# Patient Record
Sex: FEMALE | Race: WHITE | NOT HISPANIC OR LATINO | Employment: OTHER | URBAN - METROPOLITAN AREA
[De-identification: names, ages, dates, MRNs, and addresses within clinical notes are randomized per-mention and may not be internally consistent; named-entity substitution may affect disease eponyms.]

---

## 2019-12-09 ENCOUNTER — HISTORICAL (OUTPATIENT)
Dept: WOUND CARE | Facility: HOSPITAL | Age: 64
End: 2019-12-09

## 2020-02-05 LAB — GRAM STN SPEC: NORMAL

## 2020-02-08 LAB
FINAL CULTURE: NORMAL
FINAL CULTURE: NORMAL

## 2020-02-12 LAB — GRAM STN SPEC: NORMAL

## 2020-02-15 LAB
FINAL CULTURE: NORMAL
FINAL CULTURE: NORMAL

## 2020-08-31 ENCOUNTER — HISTORICAL (OUTPATIENT)
Dept: RADIOLOGY | Facility: HOSPITAL | Age: 65
End: 2020-08-31

## 2021-05-25 PROBLEM — S42.201D CLOSED FRACTURE OF PROXIMAL END OF RIGHT HUMERUS WITH ROUTINE HEALING: Status: ACTIVE | Noted: 2021-05-25

## 2021-09-22 ENCOUNTER — HISTORICAL (OUTPATIENT)
Dept: RADIOLOGY | Facility: HOSPITAL | Age: 66
End: 2021-09-22

## 2021-09-22 ENCOUNTER — HISTORICAL (OUTPATIENT)
Dept: WOUND CARE | Facility: HOSPITAL | Age: 66
End: 2021-09-22

## 2024-01-23 ENCOUNTER — HOSPITAL ENCOUNTER (INPATIENT)
Facility: HOSPITAL | Age: 69
LOS: 3 days | Discharge: HOME | DRG: 280 | End: 2024-01-26
Attending: FAMILY MEDICINE | Admitting: HOSPITALIST
Payer: MEDICARE

## 2024-01-23 DIAGNOSIS — K92.2 GASTROINTESTINAL HEMORRHAGE, UNSPECIFIED GASTROINTESTINAL HEMORRHAGE TYPE: ICD-10-CM

## 2024-01-23 DIAGNOSIS — N18.6 ESRD (END STAGE RENAL DISEASE) ON DIALYSIS: ICD-10-CM

## 2024-01-23 DIAGNOSIS — E87.6 HYPOKALEMIA: ICD-10-CM

## 2024-01-23 DIAGNOSIS — R00.0 TACHYCARDIA: ICD-10-CM

## 2024-01-23 DIAGNOSIS — I21.4 NSTEMI (NON-ST ELEVATED MYOCARDIAL INFARCTION): ICD-10-CM

## 2024-01-23 DIAGNOSIS — R00.1 BRADYCARDIA: ICD-10-CM

## 2024-01-23 DIAGNOSIS — Z99.2 ESRD (END STAGE RENAL DISEASE) ON DIALYSIS: ICD-10-CM

## 2024-01-23 DIAGNOSIS — I48.91 ATRIAL FIBRILLATION WITH RVR: Primary | ICD-10-CM

## 2024-01-23 DIAGNOSIS — R53.1 WEAK: ICD-10-CM

## 2024-01-23 DIAGNOSIS — E83.42 HYPOMAGNESEMIA: ICD-10-CM

## 2024-01-23 LAB
ABO + RH BLD: NORMAL
ABO + RH BLD: NORMAL
ABO AND RH: NORMAL
ABORH RETYPE: NORMAL
ALBUMIN SERPL-MCNC: 2.8 G/DL (ref 3.4–5)
ALBUMIN/GLOB SERPL: 0.9 RATIO
ALP SERPL-CCNC: 136 UNIT/L (ref 50–144)
ALT SERPL-CCNC: 40 UNIT/L (ref 1–45)
ANION GAP SERPL CALC-SCNC: 11 MEQ/L (ref 2–13)
ANTIBODY SCREEN: NORMAL
APTT PPP: 36.2 SECONDS (ref 23–29.4)
AST SERPL-CCNC: 48 UNIT/L (ref 14–36)
BASOPHILS # BLD AUTO: 0.03 X10(3)/MCL (ref 0.01–0.08)
BASOPHILS NFR BLD AUTO: 0.2 % (ref 0.1–1.2)
BILIRUB SERPL-MCNC: 0.4 MG/DL (ref 0–1)
BLD PROD TYP BPU: NORMAL
BLD PROD TYP BPU: NORMAL
BLOOD UNIT EXPIRATION DATE: NORMAL
BLOOD UNIT EXPIRATION DATE: NORMAL
BLOOD UNIT TYPE CODE: 600
BLOOD UNIT TYPE CODE: 600
BUN SERPL-MCNC: 28 MG/DL (ref 7–20)
CALCIUM SERPL-MCNC: 7.4 MG/DL (ref 8.4–10.2)
CHLORIDE SERPL-SCNC: 99 MMOL/L (ref 98–110)
CK MB SERPL-MCNC: 2.02 NG/ML (ref 0–3.38)
CK SERPL-CCNC: 25 U/L (ref 30–135)
CO2 SERPL-SCNC: 24 MMOL/L (ref 21–32)
CREAT SERPL-MCNC: 2.05 MG/DL (ref 0.66–1.25)
CREAT/UREA NIT SERPL: 14 (ref 12–20)
CROSSMATCH INTERPRETATION: NORMAL
CROSSMATCH INTERPRETATION: NORMAL
DISPENSE STATUS: NORMAL
DISPENSE STATUS: NORMAL
EOSINOPHIL # BLD AUTO: 0.13 X10(3)/MCL (ref 0.04–0.36)
EOSINOPHIL NFR BLD AUTO: 0.9 % (ref 0.7–7)
ERYTHROCYTE [DISTWIDTH] IN BLOOD BY AUTOMATED COUNT: 16.6 % (ref 11–14.5)
GFR SERPLBLD CREATININE-BSD FMLA CKD-EPI: 26 MLS/MIN/1.73/M2
GLOBULIN SER-MCNC: 3.1 GM/DL (ref 2–3.9)
GLUCOSE SERPL-MCNC: 94 MG/DL (ref 70–115)
HCT VFR BLD AUTO: 19.5 % (ref 36–48)
HCT VFR BLD AUTO: 26 % (ref 36–48)
HGB BLD-MCNC: 6.6 G/DL (ref 11.8–16)
HGB BLD-MCNC: 8.9 G/DL (ref 11.8–16)
IMM GRANULOCYTES # BLD AUTO: 0.09 X10(3)/MCL (ref 0–0.03)
IMM GRANULOCYTES NFR BLD AUTO: 0.6 % (ref 0–0.5)
INR PPP: 1.4
LYMPHOCYTES # BLD AUTO: 0.89 X10(3)/MCL (ref 1.16–3.74)
LYMPHOCYTES NFR BLD AUTO: 6.2 % (ref 20–55)
MAGNESIUM SERPL-MCNC: 1.4 MG/DL (ref 1.8–2.4)
MCH RBC QN AUTO: 31.4 PG (ref 27–34)
MCHC RBC AUTO-ENTMCNC: 33.8 G/DL (ref 31–37)
MCV RBC AUTO: 92.9 FL (ref 79–99)
MONOCYTES # BLD AUTO: 0.69 X10(3)/MCL (ref 0.24–0.36)
MONOCYTES NFR BLD AUTO: 4.8 % (ref 4.7–12.5)
NEUTROPHILS # BLD AUTO: 12.45 X10(3)/MCL (ref 1.56–6.13)
NEUTROPHILS NFR BLD AUTO: 87.3 % (ref 37–73)
NRBC BLD AUTO-RTO: 0 %
PLATELET # BLD AUTO: 177 X10(3)/MCL (ref 140–371)
PMV BLD AUTO: 8.5 FL (ref 9.4–12.4)
POCT GLUCOSE: 219 MG/DL (ref 70–110)
POTASSIUM SERPL-SCNC: 3.1 MMOL/L (ref 3.5–5.1)
PROT SERPL-MCNC: 5.9 GM/DL (ref 6.3–8.2)
PROTHROMBIN TIME: 14 SECONDS (ref 9.3–11.9)
RBC # BLD AUTO: 2.1 X10(6)/MCL (ref 4–5.1)
SODIUM SERPL-SCNC: 134 MMOL/L (ref 135–145)
SPECIMEN OUTDATE: NORMAL
TROPONIN I SERPL-MCNC: <0.012 NG/ML (ref 0–0.03)
UNIT NUMBER: NORMAL
UNIT NUMBER: NORMAL
WBC # SPEC AUTO: 14.28 X10(3)/MCL (ref 4–11.5)

## 2024-01-23 PROCEDURE — C9113 INJ PANTOPRAZOLE SODIUM, VIA: HCPCS | Performed by: FAMILY MEDICINE

## 2024-01-23 PROCEDURE — 86850 RBC ANTIBODY SCREEN: CPT | Performed by: FAMILY MEDICINE

## 2024-01-23 PROCEDURE — 93005 ELECTROCARDIOGRAM TRACING: CPT

## 2024-01-23 PROCEDURE — 82550 ASSAY OF CK (CPK): CPT | Performed by: FAMILY MEDICINE

## 2024-01-23 PROCEDURE — 99291 CRITICAL CARE FIRST HOUR: CPT

## 2024-01-23 PROCEDURE — 25000003 PHARM REV CODE 250: Performed by: FAMILY MEDICINE

## 2024-01-23 PROCEDURE — 83540 ASSAY OF IRON: CPT | Performed by: FAMILY MEDICINE

## 2024-01-23 PROCEDURE — 93010 ELECTROCARDIOGRAM REPORT: CPT | Mod: ,,, | Performed by: INTERNAL MEDICINE

## 2024-01-23 PROCEDURE — 96361 HYDRATE IV INFUSION ADD-ON: CPT

## 2024-01-23 PROCEDURE — 83735 ASSAY OF MAGNESIUM: CPT | Performed by: FAMILY MEDICINE

## 2024-01-23 PROCEDURE — 82553 CREATINE MB FRACTION: CPT | Performed by: FAMILY MEDICINE

## 2024-01-23 PROCEDURE — 85025 COMPLETE CBC W/AUTO DIFF WBC: CPT | Performed by: FAMILY MEDICINE

## 2024-01-23 PROCEDURE — 36415 COLL VENOUS BLD VENIPUNCTURE: CPT | Performed by: FAMILY MEDICINE

## 2024-01-23 PROCEDURE — 36430 TRANSFUSION BLD/BLD COMPNT: CPT

## 2024-01-23 PROCEDURE — 85610 PROTHROMBIN TIME: CPT | Performed by: FAMILY MEDICINE

## 2024-01-23 PROCEDURE — 86923 COMPATIBILITY TEST ELECTRIC: CPT | Mod: 91 | Performed by: FAMILY MEDICINE

## 2024-01-23 PROCEDURE — 96368 THER/DIAG CONCURRENT INF: CPT

## 2024-01-23 PROCEDURE — 20000000 HC ICU ROOM

## 2024-01-23 PROCEDURE — 30233N1 TRANSFUSION OF NONAUTOLOGOUS RED BLOOD CELLS INTO PERIPHERAL VEIN, PERCUTANEOUS APPROACH: ICD-10-PCS | Performed by: HOSPITALIST

## 2024-01-23 PROCEDURE — 96375 TX/PRO/DX INJ NEW DRUG ADDON: CPT

## 2024-01-23 PROCEDURE — 80053 COMPREHEN METABOLIC PANEL: CPT | Performed by: FAMILY MEDICINE

## 2024-01-23 PROCEDURE — 85018 HEMOGLOBIN: CPT | Performed by: FAMILY MEDICINE

## 2024-01-23 PROCEDURE — P9016 RBC LEUKOCYTES REDUCED: HCPCS | Performed by: FAMILY MEDICINE

## 2024-01-23 PROCEDURE — 84484 ASSAY OF TROPONIN QUANT: CPT | Performed by: FAMILY MEDICINE

## 2024-01-23 PROCEDURE — 85730 THROMBOPLASTIN TIME PARTIAL: CPT | Performed by: FAMILY MEDICINE

## 2024-01-23 PROCEDURE — 96365 THER/PROPH/DIAG IV INF INIT: CPT | Mod: 59

## 2024-01-23 PROCEDURE — 63600175 PHARM REV CODE 636 W HCPCS: Performed by: FAMILY MEDICINE

## 2024-01-23 PROCEDURE — 25000003 PHARM REV CODE 250: Performed by: HOSPITALIST

## 2024-01-23 RX ORDER — PHENAZOPYRIDINE HYDROCHLORIDE 200 MG/1
200 TABLET, FILM COATED ORAL
COMMUNITY

## 2024-01-23 RX ORDER — PROCHLORPERAZINE EDISYLATE 5 MG/ML
10 INJECTION INTRAMUSCULAR; INTRAVENOUS
Status: COMPLETED | OUTPATIENT
Start: 2024-01-23 | End: 2024-01-23

## 2024-01-23 RX ORDER — METOPROLOL TARTRATE 1 MG/ML
5 INJECTION, SOLUTION INTRAVENOUS
Status: DISCONTINUED | OUTPATIENT
Start: 2024-01-23 | End: 2024-01-23

## 2024-01-23 RX ORDER — FAMOTIDINE 10 MG/ML
20 INJECTION INTRAVENOUS DAILY
Status: DISCONTINUED | OUTPATIENT
Start: 2024-01-23 | End: 2024-01-26 | Stop reason: HOSPADM

## 2024-01-23 RX ORDER — ONDANSETRON HYDROCHLORIDE 2 MG/ML
4 INJECTION, SOLUTION INTRAVENOUS EVERY 8 HOURS PRN
Status: DISCONTINUED | OUTPATIENT
Start: 2024-01-23 | End: 2024-01-26 | Stop reason: HOSPADM

## 2024-01-23 RX ORDER — SODIUM CHLORIDE 0.9 % (FLUSH) 0.9 %
10 SYRINGE (ML) INJECTION
Status: DISCONTINUED | OUTPATIENT
Start: 2024-01-23 | End: 2024-01-26 | Stop reason: HOSPADM

## 2024-01-23 RX ORDER — DILTIAZEM HYDROCHLORIDE 30 MG/1
120 TABLET, FILM COATED ORAL DAILY
Status: DISCONTINUED | OUTPATIENT
Start: 2024-01-24 | End: 2024-01-25

## 2024-01-23 RX ORDER — DILTIAZEM HYDROCHLORIDE 5 MG/ML
15 INJECTION INTRAVENOUS
Status: DISCONTINUED | OUTPATIENT
Start: 2024-01-23 | End: 2024-01-23

## 2024-01-23 RX ORDER — IBUPROFEN 200 MG
24 TABLET ORAL
Status: DISCONTINUED | OUTPATIENT
Start: 2024-01-23 | End: 2024-01-26 | Stop reason: HOSPADM

## 2024-01-23 RX ORDER — PROCHLORPERAZINE EDISYLATE 5 MG/ML
5 INJECTION INTRAMUSCULAR; INTRAVENOUS EVERY 6 HOURS PRN
Status: DISCONTINUED | OUTPATIENT
Start: 2024-01-23 | End: 2024-01-26 | Stop reason: HOSPADM

## 2024-01-23 RX ORDER — SODIUM,POTASSIUM PHOSPHATES 280-250MG
2 POWDER IN PACKET (EA) ORAL
Status: DISCONTINUED | OUTPATIENT
Start: 2024-01-23 | End: 2024-01-26 | Stop reason: HOSPADM

## 2024-01-23 RX ORDER — LANOLIN ALCOHOL/MO/W.PET/CERES
800 CREAM (GRAM) TOPICAL
Status: DISCONTINUED | OUTPATIENT
Start: 2024-01-23 | End: 2024-01-26 | Stop reason: HOSPADM

## 2024-01-23 RX ORDER — FAMOTIDINE 20 MG/1
20 TABLET, FILM COATED ORAL 2 TIMES DAILY
COMMUNITY

## 2024-01-23 RX ORDER — FERROUS SULFATE 325(65) MG
325 TABLET, DELAYED RELEASE (ENTERIC COATED) ORAL 2 TIMES DAILY
COMMUNITY

## 2024-01-23 RX ORDER — HYDROCODONE BITARTRATE AND ACETAMINOPHEN 500; 5 MG/1; MG/1
TABLET ORAL
Status: DISCONTINUED | OUTPATIENT
Start: 2024-01-23 | End: 2024-01-26 | Stop reason: HOSPADM

## 2024-01-23 RX ORDER — METOPROLOL TARTRATE 1 MG/ML
5 INJECTION, SOLUTION INTRAVENOUS
Status: COMPLETED | OUTPATIENT
Start: 2024-01-23 | End: 2024-01-23

## 2024-01-23 RX ORDER — POTASSIUM CHLORIDE 7.45 MG/ML
10 INJECTION INTRAVENOUS
Status: COMPLETED | OUTPATIENT
Start: 2024-01-23 | End: 2024-01-23

## 2024-01-23 RX ORDER — MAGNESIUM SULFATE HEPTAHYDRATE 40 MG/ML
2 INJECTION, SOLUTION INTRAVENOUS
Status: COMPLETED | OUTPATIENT
Start: 2024-01-23 | End: 2024-01-23

## 2024-01-23 RX ORDER — HYDROXYZINE HYDROCHLORIDE 25 MG/1
25 TABLET, FILM COATED ORAL 4 TIMES DAILY
COMMUNITY

## 2024-01-23 RX ORDER — IBUPROFEN 200 MG
16 TABLET ORAL
Status: DISCONTINUED | OUTPATIENT
Start: 2024-01-23 | End: 2024-01-26 | Stop reason: HOSPADM

## 2024-01-23 RX ORDER — FLUOXETINE 10 MG/1
10 TABLET ORAL DAILY
COMMUNITY

## 2024-01-23 RX ORDER — DILTIAZEM HYDROCHLORIDE 120 MG/1
120 TABLET, FILM COATED ORAL DAILY
COMMUNITY

## 2024-01-23 RX ORDER — METOPROLOL TARTRATE 50 MG/1
50 TABLET ORAL 2 TIMES DAILY
COMMUNITY

## 2024-01-23 RX ORDER — PANTOPRAZOLE SODIUM 40 MG/10ML
40 INJECTION, POWDER, LYOPHILIZED, FOR SOLUTION INTRAVENOUS
Status: COMPLETED | OUTPATIENT
Start: 2024-01-23 | End: 2024-01-23

## 2024-01-23 RX ORDER — ACETAMINOPHEN 325 MG/1
650 TABLET ORAL EVERY 4 HOURS PRN
Status: DISCONTINUED | OUTPATIENT
Start: 2024-01-23 | End: 2024-01-26 | Stop reason: HOSPADM

## 2024-01-23 RX ORDER — HYDRALAZINE HYDROCHLORIDE 25 MG/1
25 TABLET, FILM COATED ORAL 3 TIMES DAILY
COMMUNITY

## 2024-01-23 RX ORDER — ASCORBIC ACID 500 MG
500 TABLET ORAL 2 TIMES DAILY
COMMUNITY

## 2024-01-23 RX ORDER — POLYETHYLENE GLYCOL 3350 17 G/17G
17 POWDER, FOR SOLUTION ORAL DAILY
COMMUNITY

## 2024-01-23 RX ORDER — DILTIAZEM HYDROCHLORIDE 5 MG/ML
10 INJECTION INTRAVENOUS
Status: COMPLETED | OUTPATIENT
Start: 2024-01-23 | End: 2024-01-23

## 2024-01-23 RX ORDER — FLUOXETINE 10 MG/1
10 CAPSULE ORAL DAILY
Status: DISCONTINUED | OUTPATIENT
Start: 2024-01-24 | End: 2024-01-26 | Stop reason: HOSPADM

## 2024-01-23 RX ORDER — TRAZODONE HYDROCHLORIDE 50 MG/1
50 TABLET ORAL NIGHTLY
Status: DISCONTINUED | OUTPATIENT
Start: 2024-01-23 | End: 2024-01-26 | Stop reason: HOSPADM

## 2024-01-23 RX ORDER — GLUCAGON 1 MG
1 KIT INJECTION
Status: DISCONTINUED | OUTPATIENT
Start: 2024-01-23 | End: 2024-01-26 | Stop reason: HOSPADM

## 2024-01-23 RX ADMIN — TRAZODONE HYDROCHLORIDE 50 MG: 50 TABLET ORAL at 10:01

## 2024-01-23 RX ADMIN — SODIUM CHLORIDE 1000 ML: 9 INJECTION, SOLUTION INTRAVENOUS at 02:01

## 2024-01-23 RX ADMIN — MAGNESIUM SULFATE HEPTAHYDRATE 2 G: 40 INJECTION, SOLUTION INTRAVENOUS at 04:01

## 2024-01-23 RX ADMIN — FAMOTIDINE 20 MG: 10 INJECTION, SOLUTION INTRAVENOUS at 10:01

## 2024-01-23 RX ADMIN — PROCHLORPERAZINE EDISYLATE 10 MG: 5 INJECTION INTRAMUSCULAR; INTRAVENOUS at 03:01

## 2024-01-23 RX ADMIN — INSULIN DETEMIR 20 UNITS: 100 INJECTION, SOLUTION SUBCUTANEOUS at 10:01

## 2024-01-23 RX ADMIN — DEXTROSE MONOHYDRATE 5 MG/HR: 50 INJECTION, SOLUTION INTRAVENOUS at 04:01

## 2024-01-23 RX ADMIN — PANTOPRAZOLE SODIUM 40 MG: 40 INJECTION, POWDER, LYOPHILIZED, FOR SOLUTION INTRAVENOUS at 03:01

## 2024-01-23 RX ADMIN — DILTIAZEM HYDROCHLORIDE 10 MG: 5 INJECTION INTRAVENOUS at 03:01

## 2024-01-23 RX ADMIN — POTASSIUM CHLORIDE 10 MEQ: 7.46 INJECTION, SOLUTION INTRAVENOUS at 03:01

## 2024-01-23 RX ADMIN — METOPROLOL TARTRATE 5 MG: 1 INJECTION, SOLUTION INTRAVENOUS at 04:01

## 2024-01-23 NOTE — ED PROVIDER NOTES
"Encounter Date: 1/23/2024       History     Chief Complaint   Patient presents with    Hypotension    Tachycardia     Pt reports "I was at dialysis this morning and they were giving me fluids after." EMS reports "they realized she was hypotensive. She vomited a lot on the way here too." Pt c/o left sided chest pain described as "burning pressure" starting during dialysis.     Nausea    Vomiting    Chest Pain     Patient presented to the emergency room from dialysis.  After dialysis, it was noted that the patient's blood pressure was very low in the 50s, and she was feeling weak and nauseated.  They started IV fluids and called for EMS transport.  Patient notes that she has been nauseated and throwing up for the past day or so.  She does take Eliquis for known AFib.    The history is provided by the patient and the EMS personnel.     Review of patient's allergies indicates:  No Known Allergies  No past medical history on file.  No past surgical history on file.  No family history on file.  Social History     Tobacco Use    Smoking status: Former    Smokeless tobacco: Never   Substance Use Topics    Alcohol use: Never    Drug use: Never     Review of Systems   Constitutional:  Positive for fatigue. Negative for fever.   HENT:  Negative for sore throat.    Respiratory:  Negative for shortness of breath.    Cardiovascular:  Negative for chest pain.   Gastrointestinal:  Positive for nausea and vomiting.   Genitourinary:  Negative for dysuria.   Musculoskeletal:  Negative for back pain.   Skin:  Negative for rash.   Neurological:  Negative for weakness.   Hematological:  Does not bruise/bleed easily.   All other systems reviewed and are negative.      Physical Exam     Initial Vitals   BP Pulse Resp Temp SpO2   01/23/24 1447 01/23/24 1443 01/23/24 1443 01/23/24 1443 01/23/24 1443   (!) 59/38 (!) 142 20 98.2 °F (36.8 °C) 98 %      MAP       --                Physical Exam    Nursing note and vitals " reviewed.  Constitutional:   Elderly patient looks uncomfortable but is in no distress.   HENT:   Head: Normocephalic and atraumatic.   Eyes: EOM are normal. Pupils are equal, round, and reactive to light.   Pale conjunctiva   Neck: Neck supple.   Normal range of motion.  Cardiovascular:  Normal rate, regular rhythm and normal heart sounds.           Pulmonary/Chest: Breath sounds normal.   Abdominal: Abdomen is soft. Bowel sounds are normal. There is no abdominal tenderness.   Musculoskeletal:         General: No edema. Normal range of motion.      Cervical back: Normal range of motion and neck supple.     Neurological: She is alert and oriented to person, place, and time.   Skin: Skin is warm and dry. Capillary refill takes less than 2 seconds.   Psychiatric: She has a normal mood and affect.         ED Course   Critical Care    Date/Time: 1/23/2024 2:35 PM    Performed by: Tony Larose MD  Authorized by: Tony Larose MD  Direct patient critical care time: 15 minutes  Ordering / reviewing critical care time: 15 minutes  Documentation critical care time: 10 minutes  Total critical care time (exclusive of procedural time) : 40 minutes  Critical care was necessary to treat or prevent imminent or life-threatening deterioration of the following conditions: cardiac failure and metabolic crisis.  Critical care was time spent personally by me on the following activities: interpretation of cardiac output measurements, evaluation of patient's response to treatment, examination of patient, obtaining history from patient or surrogate, ordering and performing treatments and interventions, ordering and review of laboratory studies, ordering and review of radiographic studies, pulse oximetry and re-evaluation of patient's condition.        Labs Reviewed   COMPREHENSIVE METABOLIC PANEL - Abnormal; Notable for the following components:       Result Value    Sodium Level 134 (*)     Potassium Level 3.1 (*)     Blood Urea  Nitrogen 28.0 (*)     Creatinine 2.05 (*)     Calcium Level Total 7.4 (*)     Protein Total 5.9 (*)     Albumin Level 2.8 (*)     Aspartate Aminotransferase 48 (*)     All other components within normal limits   CK - Abnormal; Notable for the following components:    Creatine Kinase 25 (*)     All other components within normal limits   MAGNESIUM - Abnormal; Notable for the following components:    Magnesium Level 1.40 (*)     All other components within normal limits   CBC WITH DIFFERENTIAL - Abnormal; Notable for the following components:    WBC 14.28 (*)     RBC 2.10 (*)     Hgb 6.6 (*)     Hct 19.5 (*)     RDW 16.6 (*)     MPV 8.5 (*)     Neut % 87.3 (*)     Lymph % 6.2 (*)     Lymph # 0.89 (*)     Neut # 12.45 (*)     Mono # 0.69 (*)     IG# 0.09 (*)     IG% 0.6 (*)     All other components within normal limits   TROPONIN I - Normal   CK-MB - Normal   CBC W/ AUTO DIFFERENTIAL    Narrative:     The following orders were created for panel order CBC Auto Differential.  Procedure                               Abnormality         Status                     ---------                               -----------         ------                     CBC with Differential[[id number]]       Abnormal            Final result                 Please view results for these tests on the individual orders.   PROTIME-INR   APTT   IRON AND TIBC   TYPE & SCREEN   ABORH RETYPE   PREPARE RBC SOFT     EKG Readings: (Independently Interpreted)   EKG with afib with RvR (140s), diffuse ST depression, nl T (known afib).         Imaging Results    None          Medications   0.9%  NaCl infusion (for blood administration) (has no administration in time range)   magnesium sulfate 2g in water 50mL IVPB (premix) (2 g Intravenous New Bag 1/23/24 1617)   diltiaZEM 100 mg in dextrose 5% 100 mL IVPB (ready to mix) (titrating) (has no administration in time range)   diltiaZEM injection 10 mg (10 mg Intravenous Given 1/23/24 1513)   pantoprazole  injection 40 mg (40 mg Intravenous Given 1/23/24 1517)   sodium chloride 0.9% bolus 1,000 mL 1,000 mL (0 mLs Intravenous Stopped 1/23/24 1545)   potassium chloride 10 mEq in 100 mL IVPB (10 mEq Intravenous New Bag 1/23/24 1553)   prochlorperazine injection Soln 10 mg (10 mg Intravenous Given 1/23/24 1551)   metoprolol injection 5 mg (5 mg Intravenous Given 1/23/24 1613)     Medical Decision Making  BP 50s systolic.  Pulse 140s    After bolus, BP to 60s.  Given 1 dose of cardizem, pulse still in 120s, BP to 90s.    H/H 6/19.  Suspect GI bleed as source.  PPI, transfusion ordered.  BUN/Cr 28/2.  K 3.1.  Will replace. Mag 1.4.  Will replace.    Minimal HR response to metoprolol.  Will start Cardizem drip.  BP much improved, , also correlating to correction of Mag and K.    Amount and/or Complexity of Data Reviewed  Labs: ordered.    Risk  Prescription drug management.                                      Clinical Impression:  Final diagnoses:  [R53.1] Weak  [K92.2] Gastrointestinal hemorrhage, unspecified gastrointestinal hemorrhage type  [N18.6, Z99.2] ESRD (end stage renal disease) on dialysis  [I48.91] Atrial fibrillation with RVR (Primary)  [E83.42] Hypomagnesemia  [E87.6] Hypokalemia          ED Disposition Condition    Admit Stable                Tony Larose MD  01/23/24 3094

## 2024-01-23 NOTE — H&P
"Tele-Hospitalist History and Physical  Telemedicine Service was provided using HIPPA compliant web platform using SOC for audio/visual equipment.   Patient Location: [place]   Provider Location: [place], [place]  Participants on call: bedside RN, patient  Physician on call: Dr. Beltran  Patient aware of remote access and use of Telemedicine and agrees to continue with care.        History & Physical  Department of Hospital Medicine      SUBJECTIVE:     CC/Reason for Admission to Hospital:   Chief Complaint   Patient presents with    Hypotension    Tachycardia     Pt reports "I was at dialysis this morning and they were giving me fluids after." EMS reports "they realized she was hypotensive. She vomited a lot on the way here too." Pt c/o left sided chest pain described as "burning pressure" starting during dialysis.     Nausea    Vomiting    Chest Pain       History of Present Illness: History obtained from Patient/ER Physician     Pt is a 68 y.o. female with hx of Paroxysmal Afib, ESRD on HD, DMII, Depression sent to ER after HD today with NV and not feeling well during and after HD.  After HD finished she was noted to be Hypotensive and got fluid back.  On arrival to ER was found to be in Afib with RVR and as well as hypotensive and anemic.  No reports of blood loss that she is aware.      Review of patient's allergies indicates:  No Known Allergies     ESRD on HD, HTN, Depression   No past surgical history on file.  No family history on file.  Social History     Tobacco Use    Smoking status: Former    Smokeless tobacco: Never   Substance Use Topics    Alcohol use: Never    Drug use: Never         No current facility-administered medications on file prior to encounter.     Current Outpatient Medications on File Prior to Encounter   Medication Sig Dispense Refill    arginine-vitamin C-vitamin E 4.5 gram-156 mg/9.2 gram PwPk Take 1 packet by mouth 2 (two) times a day.      ascorbic acid, vitamin C, (VITAMIN C) " 500 MG tablet Take 500 mg by mouth 2 (two) times daily.      atorvastatin (LIPITOR) 80 MG tablet Take 80 mg by mouth once daily.      CENTRUM SILVER WOMEN 8 mg iron-400 mcg-300 mcg Tab Take 1 tablet by mouth once daily.      diltiaZEM (CARDIZEM) 120 MG tablet Take 120 mg by mouth Daily.      famotidine (PEPCID) 20 MG tablet Take 20 mg by mouth 2 (two) times daily.      ferrous sulfate 325 (65 FE) MG EC tablet Take 325 mg by mouth 2 (two) times daily.      FLUoxetine 10 MG Tab Take 10 mg by mouth once daily.      hydrALAZINE (APRESOLINE) 25 MG tablet Take 25 mg by mouth 3 (three) times daily.      hydrOXYzine HCL (ATARAX) 25 MG tablet Take 25 mg by mouth 4 (four) times daily.      insulin detemir U-100 (LEVEMIR) 100 unit/mL injection Inject 35 Units into the skin 2 (two) times a day.      insulin regular 100 unit/mL Inj injection Inject into the skin 4 (four) times daily. Per sliding scale      loratadine (CLARITIN) 10 mg tablet Take 10 mg by mouth once daily.      metoprolol tartrate (LOPRESSOR) 50 MG tablet Take 50 mg by mouth 2 (two) times daily.      phenazopyridine (PYRIDIUM) 200 MG tablet Take 200 mg by mouth 3 (three) times daily with meals.      polyethylene glycol (GLYCOLAX) 17 gram PwPk Take 17 g by mouth once daily.      traZODone (DESYREL) 50 MG tablet Take 50 mg by mouth every evening.      [DISCONTINUED] ALPRAZolam (XANAX) 0.25 MG tablet       [DISCONTINUED] aspirin (ECOTRIN) 81 MG EC tablet       [DISCONTINUED] aspirin (ECOTRIN) 81 MG EC tablet Take 81 mg by mouth once daily.      [DISCONTINUED] atorvastatin (LIPITOR) 80 MG tablet       [DISCONTINUED] cyclobenzaprine (FLEXERIL) 10 MG tablet       [DISCONTINUED] gabapentin (NEURONTIN) 300 MG capsule       [DISCONTINUED] HYDROcodone-acetaminophen (NORCO) 5-325 mg per tablet       [DISCONTINUED] metFORMIN (GLUCOPHAGE) 1000 MG tablet metformin 1,000 mg tablet      [DISCONTINUED] nut.tx.gluc.intol,lac-free,soy (GLUCERNA SHAKE) Liqd       [DISCONTINUED]  omeprazole (PRILOSEC) 40 MG capsule       [DISCONTINUED] oxybutynin (DITROPAN) 5 MG Tab       [DISCONTINUED] rivaroxaban (XARELTO) 10 mg Tab       [DISCONTINUED] STOOL SOFTENER-LAXATIVE 8.6-50 mg per tablet Take 1 tablet by mouth 2 (two) times daily.      [DISCONTINUED] TRUE METRIX GLUCOSE TEST STRIP Strp       [DISCONTINUED] TYLENOL 325 mg tablet Take 325 mg by mouth daily as needed.         Review of Systems:  Constitutional: No fever, chills, weight loss  ENT: No nasal congestion or sore throat  Respiratory: No cough or shortness of breath  Cardiovascular: No chest pain or palpitations  Gastrointestinal: No nausea or vomiting, no abdominal pain or change in bowel habits  Genitourinary: No hematuria or dysuria, negative for frequency  Musculoskeletal: No arthralgias or myalgias  Neurological: No seizures or tremors, negative for dizziness and headaches  Psychiatric: No inappropriate thought content. No inappropriate behavior.      OBJECTIVE:     Vital Signs (Most Recent):  Temp: 98.1 °F (36.7 °C) (01/23/24 1716)  Pulse: 100 (01/23/24 1742)  Resp: 18 (01/23/24 1742)  BP: (!) 92/56 (01/23/24 1742)  SpO2: 96 % (01/23/24 1742)       Physical Exam:  General - Resting comfortably in bed. No apparent distress.  HEENT - PERRLA. Extraocular movements intact. No scleral icterus.   Neck -  The JVP is not elevated.   CV - Regular rate and rhythm, No Murmur, rubs, or gallops.  Resp - Good inspiratory effort. The lungs are clear to auscultation no audible wheeze  GI - Soft. Non-tender to palpation.   Extrem -  No cyanosis, clubbing, edema.  Neuro - CN II through XII are grossly intact. Moves all ext well   PSYC - Alert and awake, answers questions. Weak   SKIN - No obvious rash    Data Review:  CBC:   Lab Results   Component Value Date    WBC 14.28 (H) 01/23/2024    RBC 2.10 (L) 01/23/2024    HGB 6.6 (L) 01/23/2024    HCT 19.5 (LL) 01/23/2024     01/23/2024     BMP:   Lab Results   Component Value Date     (L)  01/23/2024    K 3.1 (L) 01/23/2024    CO2 24 01/23/2024    BUN 28.0 (H) 01/23/2024    CREATININE 2.05 (H) 01/23/2024    CALCIUM 7.4 (L) 01/23/2024         Imaging Results    None           Acute medical issues and MDM for this admission:       Problem: Afib w RVR  Differential Dx:NA  Chronic issues affecting care: ESRD  Radiology reports reviewed and/or personal interpretation: as above   Tests considered or ordered: labs, imaging   Plan of care: Now on Cardizem infusion and admit to ICU for better control and titration, should improve with fluids and blood.      Problem: Acute blood loss anemia   Differential DX: NA  Chronic issues affecting care: ESRD  Radiology reports reviewed and/or personal interpretation: as above   Tests considered or ordered: labs  Plan of care: No obvious source of bleeding but suspect ESRD is contributing.  Iron studies pendin.  T/M for 2 units of PRBCs.      Problem: Hypotension   Differential DX: blood loss vs volume contraction vs HD   Chronic issues affecting care: ESRD   Radiology reports reviewed and/or personal interpretation: as above   Tests considered or ordered: labs   Plan of care: Has responded to IVFs and blood, monitor closely on Cardizem     Problem: ESRD on HD  Plan of care: Got HD done today but will consult Nephrology in case of fluid overload.     Problem: Hypokalemia   Plan of care: Replaced in ER and monitor     Problem: Hypomagnesemia   Plan of care: Replaced and monitor             Chronic medical issues addressed during this admission and plan of care:  HTN - holding home meds       Further evaluation and treatment will be contingent on the response to the above therapy as well as the input from the consultants.  Findings for this admission discussed with patient/patient's family/ER physician.        Anticipate that the patient will require more than 2 midnights for this hospital stay and the patient will be admitted to Inpatient status.    Code Status: Full      Spent 40 mins with CC time reviewing meds and chart.  Discussed plan with patient.

## 2024-01-24 LAB
ANION GAP SERPL CALC-SCNC: 6 MEQ/L (ref 2–13)
BASOPHILS # BLD AUTO: 0.04 X10(3)/MCL (ref 0.01–0.08)
BASOPHILS NFR BLD AUTO: 0.5 % (ref 0.1–1.2)
BUN SERPL-MCNC: 37 MG/DL (ref 7–20)
CALCIUM SERPL-MCNC: 7.9 MG/DL (ref 8.4–10.2)
CHLORIDE SERPL-SCNC: 99 MMOL/L (ref 98–110)
CO2 SERPL-SCNC: 29 MMOL/L (ref 21–32)
CREAT SERPL-MCNC: 2.69 MG/DL (ref 0.66–1.25)
CREAT/UREA NIT SERPL: 14 (ref 12–20)
EOSINOPHIL # BLD AUTO: 0.1 X10(3)/MCL (ref 0.04–0.36)
EOSINOPHIL NFR BLD AUTO: 1.2 % (ref 0.7–7)
ERYTHROCYTE [DISTWIDTH] IN BLOOD BY AUTOMATED COUNT: 16.3 % (ref 11–14.5)
GFR SERPLBLD CREATININE-BSD FMLA CKD-EPI: 19 MLS/MIN/1.73/M2
GLUCOSE SERPL-MCNC: 147 MG/DL (ref 70–115)
HCT VFR BLD AUTO: 26.2 % (ref 36–48)
HGB BLD-MCNC: 8.9 G/DL (ref 11.8–16)
IMM GRANULOCYTES # BLD AUTO: 0.04 X10(3)/MCL (ref 0–0.03)
IMM GRANULOCYTES NFR BLD AUTO: 0.5 % (ref 0–0.5)
IRON SATN MFR SERPL: 65 % (ref 20–50)
IRON SERPL-MCNC: 117 UG/DL (ref 50–170)
LYMPHOCYTES # BLD AUTO: 1.87 X10(3)/MCL (ref 1.16–3.74)
LYMPHOCYTES NFR BLD AUTO: 22.1 % (ref 20–55)
MCH RBC QN AUTO: 30.8 PG (ref 27–34)
MCHC RBC AUTO-ENTMCNC: 34 G/DL (ref 31–37)
MCV RBC AUTO: 90.7 FL (ref 79–99)
MONOCYTES # BLD AUTO: 1.03 X10(3)/MCL (ref 0.24–0.36)
MONOCYTES NFR BLD AUTO: 12.2 % (ref 4.7–12.5)
NEUTROPHILS # BLD AUTO: 5.38 X10(3)/MCL (ref 1.56–6.13)
NEUTROPHILS NFR BLD AUTO: 63.5 % (ref 37–73)
NRBC BLD AUTO-RTO: 0 %
PLATELET # BLD AUTO: 150 X10(3)/MCL (ref 140–371)
PMV BLD AUTO: 8.9 FL (ref 9.4–12.4)
POCT GLUCOSE: 156 MG/DL (ref 70–110)
POCT GLUCOSE: 174 MG/DL (ref 70–110)
POCT GLUCOSE: 208 MG/DL (ref 70–110)
POTASSIUM SERPL-SCNC: 3.7 MMOL/L (ref 3.5–5.1)
RBC # BLD AUTO: 2.89 X10(6)/MCL (ref 4–5.1)
SODIUM SERPL-SCNC: 134 MMOL/L (ref 135–145)
TIBC SERPL-MCNC: 180 UG/DL (ref 250–450)
TIBC SERPL-MCNC: 63 UG/DL (ref 70–310)
TRANSFERRIN SERPL-MCNC: 114 MG/DL (ref 173–360)
WBC # SPEC AUTO: 8.46 X10(3)/MCL (ref 4–11.5)

## 2024-01-24 PROCEDURE — 80048 BASIC METABOLIC PNL TOTAL CA: CPT | Performed by: HOSPITALIST

## 2024-01-24 PROCEDURE — 25000003 PHARM REV CODE 250: Performed by: FAMILY MEDICINE

## 2024-01-24 PROCEDURE — 25000003 PHARM REV CODE 250: Performed by: HOSPITALIST

## 2024-01-24 PROCEDURE — 85025 COMPLETE CBC W/AUTO DIFF WBC: CPT | Performed by: HOSPITALIST

## 2024-01-24 PROCEDURE — 20000000 HC ICU ROOM

## 2024-01-24 RX ORDER — MICONAZOLE NITRATE 2 %
POWDER (GRAM) TOPICAL 2 TIMES DAILY
Status: DISCONTINUED | OUTPATIENT
Start: 2024-01-24 | End: 2024-01-26 | Stop reason: HOSPADM

## 2024-01-24 RX ORDER — MUPIROCIN 20 MG/G
OINTMENT TOPICAL 2 TIMES DAILY
Status: DISCONTINUED | OUTPATIENT
Start: 2024-01-24 | End: 2024-01-26 | Stop reason: HOSPADM

## 2024-01-24 RX ADMIN — INSULIN DETEMIR 20 UNITS: 100 INJECTION, SOLUTION SUBCUTANEOUS at 08:01

## 2024-01-24 RX ADMIN — Medication: at 08:01

## 2024-01-24 RX ADMIN — MUPIROCIN: 20 OINTMENT TOPICAL at 08:01

## 2024-01-24 RX ADMIN — FAMOTIDINE 20 MG: 10 INJECTION, SOLUTION INTRAVENOUS at 09:01

## 2024-01-24 RX ADMIN — MICONAZOLE NITRATE ANTIFUNGAL POWDER: 2 POWDER TOPICAL at 08:01

## 2024-01-24 RX ADMIN — FLUOXETINE 10 MG: 10 CAPSULE ORAL at 09:01

## 2024-01-24 RX ADMIN — ACETAMINOPHEN 650 MG: 325 TABLET, FILM COATED ORAL at 07:01

## 2024-01-24 RX ADMIN — MUPIROCIN: 20 OINTMENT TOPICAL at 12:01

## 2024-01-24 RX ADMIN — TRAZODONE HYDROCHLORIDE 50 MG: 50 TABLET ORAL at 08:01

## 2024-01-24 RX ADMIN — DILTIAZEM HYDROCHLORIDE 120 MG: 30 TABLET, FILM COATED ORAL at 05:01

## 2024-01-24 NOTE — PROGRESS NOTES
Hospital Medicine  Progress Note    Patient Name: Elvira Caldwell  MRN: [id number]  Status: IP- Inpatient   Admission Date: 1/23/2024  Length of Stay: 1  Date of Service: 01/24/2024       CC: hospital follow-up for        SUBJECTIVE   68 y.o. female with hx of Paroxysmal Afib, ESRD on HD, DMII, Depression sent to ER after HD today with NV and not feeling well during and after HD.  After HD finished she was noted to be Hypotensive and got fluid back.  On arrival to ER was found to be in Afib with RVR and as well as hypotensive and anemic.  No reports of blood loss that she is aware.       01/24/2024  Lab improved  BP better  Back in NSR this AM  Evaluated by cardio plan stop cardizem drip resume home meds  Monitor overnight  Watch BP closely     Today: Patient seen and examined at bedside, and chart reviewed.       MEDICATIONS   Scheduled   diltiaZEM  120 mg Oral Daily    famotidine (PF)  20 mg Intravenous Daily    FLUoxetine  10 mg Oral Daily    insulin detemir U-100 (Levemir)  20 Units Subcutaneous QHS    mupirocin   Nasal BID    traZODone  50 mg Oral QHS     Continuous Infusions   dilTIAZem Stopped (01/24/24 0909)         PHYSICAL EXAM   VITALS: T 97.5 °F (36.4 °C)   BP (!) 113/52   P 80   RR 15   O2 97 %    GENERAL: Awake and in NAD  LUNGS: CTA B/L  CVS: Normal rate  GI/: Soft, NT, bowel sounds positive.  EXTREMITIES: No peripheral edema  NEURO: AAOx3  PSYCH: Cooperative      LABS   CBC  Recent Labs     01/23/24  1505 01/23/24  2310 01/24/24  0342   WBC 14.28*  --  8.46   RBC 2.10*  --  2.89*   HGB 6.6* 8.9* 8.9*   HCT 19.5* 26.0* 26.2*   MCV 92.9  --  90.7   MCH 31.4  --  30.8   MCHC 33.8  --  34.0   RDW 16.6*  --  16.3*     --  150     CHEM  Recent Labs     01/23/24  1505 01/24/24  0342   * 134*   K 3.1* 3.7   CHLORIDE 99 99   CO2 24 29   BUN 28.0* 37.0*   CREATININE 2.05* 2.69*   GLUCOSE 94 147*   CALCIUM 7.4* 7.9*   MG 1.40*  --    ALBUMIN 2.8*  --    GLOBULIN 3.1  --    ALKPHOS 136  --    ALT 40   --    AST 48*  --    BILITOT 0.4  --    IRON 117  --    TIBC 180*  --          MICROBIOLOGY     Microbiology Results (last 7 days)       ** No results found for the last 168 hours. **              DIAGNOSTICS   X-Ray Toe 2 or More Views Left  RIGHT FOOT (3 VIEWS):     HISTORY: M86.10  OSTEOMYELITIS     COMPARISON: None available     FINDINGS: AP, oblique and lateral views of the right foot reveal  dislocation of the first metatarsophalangeal joint. The first proximal  phalanx is anterior medial to the head of the first metatarsal. Bony  structures are osteopenic. There is persistent flexion/extension of  the pelvis. No obvious fracture is seen. Small enthesophyte formation  is evident at the posterior and inferior calcaneus.     IMPRESSION:  1. Dislocation at the first metatarsophalangeal joint  2. Osteopenia  3. Persistent flexion/extension of the toes  3. Small enthesophyte formation at the calcaneus      Electronically Signed By: Scott MUELLER, [place]  Date/Time Signed: 09/22/2021 16:38        ASSESSMENT/PLAN:     Problem: Afib w RVR  1/24/2024  NSR this AM  Spoke with cardio plan- stop cardizem drip  Resume home cardizem  mg daily  Hold metoprolol for now and will follow     Differential Dx:NA  Chronic issues affecting care: ESRD  Radiology reports reviewed and/or personal interpretation: as above   Tests considered or ordered: labs, imaging   Plan of care: Now on Cardizem infusion and admit to ICU for better control and titration, should improve with fluids and blood.       Problem: Acute blood loss anemia   1/24/2024  H/H trend up and stable s/p 2 u pRBC  Occult stool pending  Suspect multifactorial     Differential DX: NA  Chronic issues affecting care: ESRD  Radiology reports reviewed and/or personal interpretation: as above   Tests considered or ordered: labs  Plan of care: No obvious source of bleeding but suspect ESRD is contributing.  Iron studies pendin.  T/M for 2 units of PRBCs.       Problem:  Hypotension   Differential DX: blood loss vs volume contraction vs HD   Chronic issues affecting care: ESRD   Radiology reports reviewed and/or personal interpretation: as above   Tests considered or ordered: labs   Plan of care: Has responded to IVFs and blood, monitor closely on Cardizem      Problem: ESRD on HD  Plan of care: Got HD done today but will consult Nephrology in case of fluid overload.   1/24/2024  Eval by nephrology- dose not appear fluid overload  Bun/CR stable recently down to 1 HD sesssion per week  Plan no HD tomorrow  Will follow lab UO, volume status although dose not appear volume overloaded at this time        Problem: Hypokalemia   Plan of care: Replaced in ER and monitor      Problem: Hypomagnesemia   Plan of care: Replaced and monitor                  Chronic medical issues addressed during this admission and plan of care:  HTN - holding home meds              Cruz Jacobs MD  [facility]

## 2024-01-24 NOTE — PLAN OF CARE
01/24/24 0853   Discharge Assessment   Assessment Type Discharge Planning Assessment   Confirmed/corrected address, phone number and insurance Yes   Confirmed Demographics   (updated)   Source of Information patient;health record   When was your last doctors appointment? 12/13/23  (unsure when last seen PCP at [facility])   Communicated SKYLA with patient/caregiver Date not available/Unable to determine   Reason For Admission AFib with RVR, Anemia, Hypotension   People in Home facility resident  ( at NH with her)   Facility Arrived From: [facility]   Do you expect to return to your current living situation? Yes   Prior to hospitilization cognitive status: Alert/Oriented   Current cognitive status: Alert/Oriented   Walking or Climbing Stairs Difficulty yes   Walking or Climbing Stairs ambulation difficulty, requires equipment   Mobility Management Wheelchair   Dressing/Bathing Difficulty yes   Dressing/Bathing bathing difficulty, dependent;dressing difficulty, dependent   Dressing/Bathing Management NH Staff   Equipment Currently Used at Home wheelchair;hospital bed   Readmission within 30 days? No   Do you take prescription medications? Yes   Do you have prescription coverage? Yes   Coverage [employer]   Do you have any problems affording any of your prescribed medications? No   Is the patient taking medications as prescribed? yes   Who is going to help you get home at discharge? [facility]   How do you get to doctors appointments? other (see comments)  ([facility])   Are you on dialysis? Yes   Dialysis Name and Scheduled days Tuesday only-[facility] [place]   Do you take coumadin? No   Discharge Plan A Return to nursing home   DME Needed Upon Discharge  none   Discharge Plan discussed with: Patient   Transition of Care Barriers None   Physical Activity   On average, how many days per week do you engage in moderate to strenuous exercise (like a brisk walk)? 0 days   On average, how many  minutes do you engage in exercise at this level? 0 min   Financial Resource Strain   How hard is it for you to pay for the very basics like food, housing, medical care, and heating? Not hard   Housing Stability   In the last 12 months, was there a time when you were not able to pay the mortgage or rent on time? N   In the last 12 months, how many places have you lived? 1   In the last 12 months, was there a time when you did not have a steady place to sleep or slept in a shelter (including now)? N   Transportation Needs   In the past 12 months, has lack of transportation kept you from medical appointments or from getting medications? no   In the past 12 months, has lack of transportation kept you from meetings, work, or from getting things needed for daily living? No   Food Insecurity   Within the past 12 months, you worried that your food would run out before you got the money to buy more. Never true   Within the past 12 months, the food you bought just didn't last and you didn't have money to get more. Never true   Stress   Do you feel stress - tense, restless, nervous, or anxious, or unable to sleep at night because your mind is troubled all the time - these days? Not at all   Social Connections   In a typical week, how many times do you talk on the phone with family, friends, or neighbors? More than 3   How often do you get together with friends or relatives? More than 3   How often do you attend Gnosticist or Yarsanism services? More than 4   Do you belong to any clubs or organizations such as Gnosticist groups, unions, fraternal or athletic groups, or school groups? No   How often do you attend meetings of the clubs or organizations you belong to? Never   Are you , , , , never , or living with a partner?    Alcohol Use   Q1: How often do you have a drink containing alcohol? Never   Q2: How many drinks containing alcohol do you have on a typical day when you are drinking?  None   Q3: How often do you have six or more drinks on one occasion? Never

## 2024-01-24 NOTE — PLAN OF CARE
REVIEWED.     Problem: Adult Inpatient Plan of Care  Goal: Plan of Care Review  1/24/2024 1235 by Kamila Díaz RN  Outcome: Ongoing, Progressing  Flowsheets (Taken 1/24/2024 1235)  Plan of Care Reviewed With: patient  1/24/2024 1059 by Kamila Díaz RN  Outcome: Ongoing, Progressing  Flowsheets (Taken 1/24/2024 1059)  Plan of Care Reviewed With: patient  Goal: Patient-Specific Goal (Individualized)  1/24/2024 1235 by Kamila Díaz RN  Outcome: Ongoing, Progressing  1/24/2024 1059 by Kamila Díaz RN  Outcome: Ongoing, Progressing  Goal: Absence of Hospital-Acquired Illness or Injury  1/24/2024 1235 by Kamila Díaz RN  Outcome: Ongoing, Progressing  1/24/2024 1059 by Kamila Díaz RN  Outcome: Ongoing, Progressing  Intervention: Identify and Manage Fall Risk  1/24/2024 1235 by Kamila Díaz RN  Flowsheets (Taken 1/24/2024 1235)  Safety Promotion/Fall Prevention:   bed alarm set   lighting adjusted   medications reviewed  1/24/2024 1059 by Kamila Díaz RN  Flowsheets (Taken 1/24/2024 1059)  Safety Promotion/Fall Prevention:   bed alarm set   medications reviewed  Intervention: Prevent Skin Injury  1/24/2024 1235 by Kamila Díaz RN  Flowsheets (Taken 1/24/2024 1235)  Body Position: turned  Skin Protection: adhesive use limited  1/24/2024 1059 by Kamila Díaz RN  Flowsheets (Taken 1/24/2024 1059)  Body Position: turned  Skin Protection: adhesive use limited  Intervention: Prevent and Manage VTE (Venous Thromboembolism) Risk  1/24/2024 1235 by Kamila Díaz RN  Flowsheets (Taken 1/24/2024 1235)  Activity Management: Rolling - L1  VTE Prevention/Management: bleeding precations maintained  Range of Motion: active ROM (range of motion) encouraged  1/24/2024 1059 by Kamila Díaz RN  Flowsheets (Taken 1/24/2024 1059)  Activity Management: Leg kicks - L2  VTE Prevention/Management:   bleeding precations maintained   bleeding risk assessed  Range of Motion: active ROM (range of motion) encouraged  Intervention: Prevent  Infection  Flowsheets (Taken 1/24/2024 1235)  Infection Prevention:   visitors restricted/screened   single patient room provided   rest/sleep promoted   hand hygiene promoted  Goal: Optimal Comfort and Wellbeing  1/24/2024 1235 by Kamila Díaz RN  Outcome: Ongoing, Progressing  1/24/2024 1059 by Kamila Díaz RN  Outcome: Ongoing, Progressing  Intervention: Monitor Pain and Promote Comfort  Flowsheets (Taken 1/24/2024 1235)  Pain Management Interventions: position adjusted  Intervention: Provide Person-Centered Care  Flowsheets (Taken 1/24/2024 1235)  Trust Relationship/Rapport:   care explained   questions answered   questions encouraged  Goal: Readiness for Transition of Care  1/24/2024 1235 by Kamila Díaz RN  Outcome: Ongoing, Progressing  1/24/2024 1059 by Kamila Díaz RN  Outcome: Ongoing, Progressing     Problem: Infection  Goal: Absence of Infection Signs and Symptoms  1/24/2024 1235 by Kamila Díaz RN  Outcome: Ongoing, Progressing  1/24/2024 1059 by Kamila Díaz RN  Outcome: Ongoing, Progressing  Intervention: Prevent or Manage Infection  Flowsheets (Taken 1/24/2024 1235)  Infection Management: aseptic technique maintained     Problem: Skin Injury Risk Increased  Goal: Skin Health and Integrity  1/24/2024 1235 by Kamila Díaz RN  Outcome: Ongoing, Progressing  1/24/2024 1059 by Kamila Díaz RN  Outcome: Ongoing, Progressing  Intervention: Optimize Skin Protection  Flowsheets (Taken 1/24/2024 1235)  Pressure Reduction Techniques: frequent weight shift encouraged  Pressure Reduction Devices: heel offloading device utilized  Skin Protection: adhesive use limited  Head of Bed (HOB) Positioning: HOB at 20-30 degrees  Intervention: Promote and Optimize Oral Intake  Flowsheets (Taken 1/24/2024 1235)  Oral Nutrition Promotion: safe use of adaptive equipment encouraged     Problem: Fall Injury Risk  Goal: Absence of Fall and Fall-Related Injury  1/24/2024 1235 by Kamila Díaz RN  Outcome: Ongoing,  Progressing  1/24/2024 1059 by Kamila Díaz, RN  Outcome: Ongoing, Progressing  Intervention: Identify and Manage Contributors  Flowsheets (Taken 1/24/2024 1235)  Self-Care Promotion: safe use of adaptive equipment encouraged  Medication Review/Management: medications reviewed  Intervention: Promote Injury-Free Environment  Flowsheets (Taken 1/24/2024 1235)  Safety Promotion/Fall Prevention:   bed alarm set   lighting adjusted   medications reviewed     Problem: Impaired Wound Healing  Goal: Optimal Wound Healing  1/24/2024 1235 by Kamila Díaz, RN  Outcome: Ongoing, Progressing  1/24/2024 1059 by Kamila Díaz, RN  Outcome: Ongoing, Progressing  Intervention: Promote Wound Healing  Flowsheets (Taken 1/24/2024 1235)  Oral Nutrition Promotion: safe use of adaptive equipment encouraged  Activity Management: Rolling - L1  Pain Management Interventions: position adjusted

## 2024-01-24 NOTE — PROGRESS NOTES
Pharmacist Renal Dose Adjustment Note    Elvira Caldwell is a 68 y.o. female being treated with the medication famotidine    Patient Data:    Vital Signs (Most Recent):  Temp: 97.9 °F (36.6 °C) (01/23/24 2130)  Pulse: 89 (01/23/24 2050)  Resp: 18 (01/23/24 2050)  BP: (!) 110/46 (01/23/24 2050)  SpO2: 97 % (01/23/24 2050) Vital Signs (72h Range):  Temp:  [97.9 °F (36.6 °C)-99.1 °F (37.3 °C)]   Pulse:  []   Resp:  [16-22]   BP: ()/(34-77)   SpO2:  [94 %-98 %]      Recent Labs   Lab 01/23/24  1505   CREATININE 2.05*     Serum creatinine: 2.05 mg/dL (H) 01/23/24 1505  Estimated creatinine clearance: 27.4 mL/min (A)    Medication:famotidine dose: 20mg frequency bid will be changed to medication:famotidine dose:20mg frequency:daily    Pharmacist's Name: Ranjith Wood

## 2024-01-24 NOTE — PROGRESS NOTES
[facility]  Wound Care    Patient Name:  Elvira Caldwell   MRN:  [id number]  Date: 1/24/2024  Diagnosis: <principal problem not specified>    History:     History reviewed. No pertinent past medical history.    Social History     Socioeconomic History    Marital status:    Tobacco Use    Smoking status: Former    Smokeless tobacco: Never   Substance and Sexual Activity    Alcohol use: Never    Drug use: Never     Social Determinants of Health     Financial Resource Strain: Low Risk  (1/24/2024)    Overall Financial Resource Strain (CARDIA)     Difficulty of Paying Living Expenses: Not hard at all   Food Insecurity: No Food Insecurity (1/24/2024)    Hunger Vital Sign     Worried About Running Out of Food in the Last Year: Never true     Ran Out of Food in the Last Year: Never true   Transportation Needs: No Transportation Needs (1/24/2024)    PRAPARE - Transportation     Lack of Transportation (Medical): No     Lack of Transportation (Non-Medical): No   Physical Activity: Inactive (1/24/2024)    Exercise Vital Sign     Days of Exercise per Week: 0 days     Minutes of Exercise per Session: 0 min   Stress: No Stress Concern Present (1/24/2024)    [place] [employer] - Occupational Stress Questionnaire     Feeling of Stress : Not at all   Social Connections: Moderately Integrated (1/24/2024)    Social Connection and Isolation Panel [NHANES]     Frequency of Communication with Friends and Family: More than three times a week     Frequency of Social Gatherings with Friends and Family: More than three times a week     Attends [employer] Services: More than 4 times per year     Active Member of Clubs or Organizations: No     Attends Club or Organization Meetings: Never     Marital Status:    Housing Stability: Low Risk  (1/24/2024)    Housing Stability Vital Sign     Unable to Pay for Housing in the Last Year: No     Number of Places Lived in the Last Year: 1     Unstable  Housing in the Last Year: No       Precautions:     Allergies as of 01/23/2024    (No Known Allergies)       WOC Assessment Details/Treatment        01/24/24 1433   WOCN Assessment   WOCN Total Time (mins) 30   Visit Date 01/24/24   Visit Time 1400   Consult Type New   Intervention assessed        Altered Skin Integrity 01/23/24 2210 upper Buttocks #1 Moisture associated dermatitis   Date First Assessed/Time First Assessed: 01/23/24 2210   Altered Skin Integrity Present on Admission - Did Patient arrive to the hospital with altered skin?: yes  Orientation: upper  Location: Buttocks  Wound Number: #1  Is this injury device related?...   Wound Image    Dressing Appearance Open to air   Drainage Amount None   Appearance Red;Purple  (blanchable with yeasty like rash noted)   Care Cleansed with:  (bathing wipes.  New order noted for aquaphor/questran/antifungal powder.)        Altered Skin Integrity 01/24/24 1436 Perineum Moisture associated dermatitis   Date First Assessed/Time First Assessed: 01/24/24 1436   Location: Perineum  Primary Wound Type: Moisture associated dermatitis   Wound Image    Dressing Appearance Open to air   Drainage Amount None   Appearance Red   Care Cleansed with:  (bathing wipes.  New order noted for aquaphor/questran/antifungal powder.)     Orders placed.     01/24/2024

## 2024-01-24 NOTE — PROGRESS NOTES
Inpatient Nutrition Evaluation    Admit Date: 1/23/2024   Total duration of encounter: 1 day    Nutrition Recommendation/Prescription     Change diet form Renal to Renal Consistent Carbohydrate, 1800 kcal with Dysphagia Bite Sized modifier.     Add Boost Glucose Control 1x/day (190 kcal, 16 gm pro) to aid in nutrition intake.     Continue to encourage PO intake and honor patient preferences.    Dietitian will monitor Electrolytes, Energy Intake, Food and Beverage Intake, Glucose/Endocrine Profile, Renal Function, Weight, and Weight Change and adjust MNT as needed.       Monitoring & Evaluation     Communication of Recommendations: reviewed with nurse and reviewed with patient    Reason Seen: continuous nutrition monitoring and malnutrition screening tool (MST)    Nutrition Risk/Follow-Up: Low (follow-up in 5-7 days)  Patients assigned 'Low nutrition risk' status do not qualify for a full nutritional assessment but will be monitored and re-evaluated in a 5-7 day time period. Please consult if re-evaluation needed sooner.    RD Follow-up?: Yes  Next Date to be Seen by RD: 01/31/24  Nutrition Assessment     Chart Review    Malnutrition Screening Tool Results   Have you recently lost weight without trying?: Unsure  Have you been eating poorly because of a decreased appetite?: No   MST Score: 2     Diagnosis:  A.fib with RVR  Acute blood loss anemia  Hypotension  ESRD-HD  Hypokalemia  Hypomagnesemia    History reviewed. No pertinent past medical history.  History reviewed. No pertinent surgical history.    Scheduled Medications:  diltiaZEM, 120 mg, Daily  famotidine (PF), 20 mg, Daily  FLUoxetine, 10 mg, Daily  insulin detemir U-100 (Levemir), 20 Units, QHS  mupirocin, , BID  traZODone, 50 mg, QHS    Continuous Infusions:  dilTIAZem, Last Rate: Stopped (01/24/24 0909)    PRN Medications: 0.9%  NaCl infusion (for blood administration), acetaminophen, dextrose 10%, dextrose 10%, glucagon (human recombinant), glucose,  "glucose, magnesium oxide, magnesium oxide, ondansetron, potassium bicarbonate, potassium bicarbonate, potassium bicarbonate, potassium, sodium phosphates, potassium, sodium phosphates, potassium, sodium phosphates, prochlorperazine, sodium chloride 0.9%    Calorie Containing IV Medications: no significant kcals from medications at this time    Nutrition-Related Labs:  Recent Labs   Lab 01/23/24  1505 01/23/24  2310 01/24/24  0342   *  --  134*   K 3.1*  --  3.7   CALCIUM 7.4*  --  7.9*   MG 1.40*  --   --    CHLORIDE 99  --  99   CO2 24  --  29   BUN 28.0*  --  37.0*   CREATININE 2.05*  --  2.69*   EGFRNORACEVR 26  --  19   GLUCOSE 94  --  147*   BILITOT 0.4  --   --    ALKPHOS 136  --   --    ALT 40  --   --    AST 48*  --   --    ALBUMIN 2.8*  --   --    WBC 14.28*  --  8.46   HGB 6.6* 8.9* 8.9*   HCT 19.5* 26.0* 26.2*     CrCl:    Lab Value: 20.9    Date: 1/24    Nutrition Orders:  Diet renal consistent carbohydrate Dysphagia Bite-sized    Other Oral Supplement Order: None/Already listed above  Appetite/Oral Intake: good/% of meals  Factors Affecting Nutritional Intake: chewing difficulty  Food/Gnosticism/Cultural Preferences:  Dislike: Spinach and Nooksack.   Food Allergies: Review of patient's allergies indicates:  No Known Allergies    Skin Integrity: wound, bruised (ecchymotic), other (see comments) (MASD TO BUTTOCK, REDNESS TO GROIN. PICTURES TAKEN. WOUND CARE NURSE NOTIFIED)  Wound(s):       Overview/Hospital Course:    (1/24): Patient reports good appetite and nurse agreed 75%-1 meal per EMR and last weight in EMR from 5/25/21. Patient reports no teeth and dentures at home so would appreciate softer/smaller food. GI: WDL/LBM-1/23, : WDL and HD , PATIENT IS INDEPENDENT WITH FEEDS, NUTR:3- ADEQUATE,BRDN:16, NO EDEMA NOTED, 24 HR I/O: INCOMPLETE.       Anthropometrics    Height: 5' 9" (175.3 cm)    Last Weight: 68.4 kg (150 lb 12.7 oz) (01/23/24 2200) Weight Method: Bed Scale  BMI (Calculated): " 22.3  BMI Classification: normal (BMI 18.5-24.9)     Ideal Body Weight (IBW), Female: 145 lb     % Ideal Body Weight, Female (lb): 108.56 %                             Usual Weight Provided By: EMR weight history and patient denies unintentional weight loss    Wt Readings from Last 5 Encounters:   01/23/24 68.4 kg (150 lb 12.7 oz)   05/25/21 56.2 kg (124 lb)     Weight Change(s) Since Admission:  Admit Weight: 71.4 kg (157 lb 6.5 oz) (01/23/24 1443)      Patient Education    Not applicable.

## 2024-01-24 NOTE — PLAN OF CARE
Problem: Adult Inpatient Plan of Care  Goal: Plan of Care Review  Outcome: Ongoing, Progressing  Goal: Patient-Specific Goal (Individualized)  Outcome: Ongoing, Progressing  Goal: Absence of Hospital-Acquired Illness or Injury  Outcome: Ongoing, Progressing  Goal: Optimal Comfort and Wellbeing  Outcome: Ongoing, Progressing  Goal: Readiness for Transition of Care  Outcome: Ongoing, Progressing     Problem: Infection  Goal: Absence of Infection Signs and Symptoms  Outcome: Ongoing, Progressing  Intervention: Prevent or Manage Infection  Flowsheets (Taken 1/24/2024 0038)  Infection Management: aseptic technique maintained     Problem: Skin Injury Risk Increased  Goal: Skin Health and Integrity  Outcome: Ongoing, Progressing     Problem: Fall Injury Risk  Goal: Absence of Fall and Fall-Related Injury  Outcome: Ongoing, Progressing  Intervention: Identify and Manage Contributors  Flowsheets (Taken 1/24/2024 0038)  Medication Review/Management:   medications reviewed   high-risk medications identified  Intervention: Promote Injury-Free Environment  Flowsheets (Taken 1/24/2024 0038)  Safety Promotion/Fall Prevention: bed alarm set

## 2024-01-24 NOTE — CONSULTS
[facility]  Nephrology  Consult Note    Patient Name: Elvira Caldwell  MRN: [id number]  Admission Date: 1/23/2024  Hospital Length of Stay: 1 days  Attending Provider: Melly Ruiz MD   Primary Care Physician: Chaparrita, Primary Doctor  Principal Problem:<principal problem not specified>    Consults  Subjective:     HPI:  Patient is a 68-year-old nursing home resident who was seen in consultation today for management of renal failure.  She reportedly has a history of ESRD but has been on twice weekly dialysis for the last year, and reports recently being dropped to once daily dialysis with her last treatment yesterday.  She reports that she does have significant residual renal function.  After her treatment yesterday she was noted to be profoundly hypotensive and was transferred to the emergency department for further evaluation.      Here she was noted to be hypotensive with a blood pressure of 59/38 and in atrial fibrillation with RVR at a rate of 142.  She was also profoundly anemic with hemoglobin at 6.6.  She denies any recent suggestive history of GI blood loss or other bleeding issues.  Of note she is on Eliquis as an outpatient.    She did receive IV fluid replacement as well as 2 units of packed red blood cells and has stabilized overnight.  She was initially placed on a Cardizem drip but has transitioned to normal sinus rhythm and we will be converted to oral Cardizem moving forward.  At time of my evaluation she denies issues with chest pain, palpitations, acute shortness of breath, abdominal pain, or nausea/vomiting.  She believes she is near her usual baseline status and does not otherwise have focal complaints.    History reviewed. No pertinent past medical history.    History reviewed. No pertinent surgical history.    Review of patient's allergies indicates:  No Known Allergies  Current Facility-Administered Medications   Medication Frequency    0.9%  NaCl infusion (for blood  administration) Q24H PRN    acetaminophen tablet 650 mg Q4H PRN    dextrose 10% bolus 125 mL 125 mL PRN    dextrose 10% bolus 250 mL 250 mL PRN    diltiaZEM 100 mg in dextrose 5% 100 mL IVPB (ready to mix) (titrating) Continuous    diltiaZEM tablet 120 mg Daily    famotidine (PF) injection 20 mg Daily    FLUoxetine capsule 10 mg Daily    glucagon (human recombinant) injection 1 mg PRN    glucose chewable tablet 16 g PRN    glucose chewable tablet 24 g PRN    insulin detemir U-100 (Levemir) pen 20 Units QHS    magnesium oxide tablet 800 mg PRN    magnesium oxide tablet 800 mg PRN    mupirocin 2 % ointment BID    ondansetron injection 4 mg Q8H PRN    potassium bicarbonate disintegrating tablet 35 mEq PRN    potassium bicarbonate disintegrating tablet 50 mEq PRN    potassium bicarbonate disintegrating tablet 60 mEq PRN    potassium, sodium phosphates 280-160-250 mg packet 2 packet PRN    potassium, sodium phosphates 280-160-250 mg packet 2 packet PRN    potassium, sodium phosphates 280-160-250 mg packet 2 packet PRN    prochlorperazine injection Soln 5 mg Q6H PRN    sodium chloride 0.9% flush 10 mL PRN    traZODone tablet 50 mg QHS     Family History    None       Tobacco Use    Smoking status: Former    Smokeless tobacco: Never   Substance and Sexual Activity    Alcohol use: Never    Drug use: Never    Sexual activity: Not on file     Review of Systems   Constitutional:         As per HPI.  Patient reports she is generally feeling well although does have some weakness and difficulty with ambulation following a partial foot amputation.  She has a small sacral wound, but minimal discomfort.  Review of systems is otherwise negative.     Objective:     Vital Signs (Most Recent):  Temp: 97.7 °F (36.5 °C) (01/24/24 0745)  Pulse: 80 (01/24/24 0900)  Resp: 18 (01/24/24 0900)  BP: (!) 116/57 (01/24/24 0900)  SpO2: 97 % (01/24/24 0900) Vital Signs (24h Range):  Temp:  [97.3 °F (36.3 °C)-99.1 °F (37.3 °C)] 97.7 °F (36.5  °C)  Pulse:  [] 80  Resp:  [13-22] 18  SpO2:  [76 %-99 %] 97 %  BP: ()/(34-82) 116/57     Weight: 68.4 kg (150 lb 12.7 oz) (01/23/24 2200)  Body mass index is 22.27 kg/m².  Body surface area is 1.82 meters squared.    I/O last 3 completed shifts:  In: 382.5 [P.O.:60; I.V.:17; Blood:305.5]  Out: -     Physical Exam  Vitals reviewed. Nursing note reviewed: Patient was seen on telemedicine rounds and is resting comfortably, supine in bed in no acute distress.    Significant Labs:  CBC:   Recent Labs   Lab 01/24/24  0342   WBC 8.46   RBC 2.89*   HGB 8.9*   HCT 26.2*      MCV 90.7   MCH 30.8   MCHC 34.0     CMP:   Recent Labs   Lab 01/23/24  1505 01/24/24  0342   CALCIUM 7.4* 7.9*   ALBUMIN 2.8*  --    * 134*   K 3.1* 3.7   CO2 24 29   BUN 28.0* 37.0*   CREATININE 2.05* 2.69*   ALKPHOS 136  --    ALT 40  --    AST 48*  --    BILITOT 0.4  --      All labs within the past 24 hours have been reviewed.    Assessment/Plan:     1. Renal:  This is a patient who appears to have stage IV-V CKD, and reports recently being dropped from twice weekly dialysis down to once weekly outpatient dialysis schedule.  Following her last treatment yesterday she developed profound hypotension and RVR associated with atrial fibrillation which did respond to volume replacement and rate control medications.  Her electrolytes are stable this morning, and she does not appear to have evidence for clinical volume overload.  We will continue to monitor patient but I will not plan on dialysis tomorrow and we will monitor renal function.  As it appears she does not have issues with volume overload it may be reasonable to consider discontinuation of dialysis altogether.      2. Hypotension:  This may have been multifactorial including volume depletion as well as atrial fibrillation with RVR.  Both appear to be controlled at present and patient is currently hemodynamically stable.    3. Atrial fibrillation:  Patient reportedly has  paroxysmal atrial fibrillation.  Of note treatment with anticoagulation for atrial fibrillation in ESRD patients has been studied and generally the risks outweigh the benefits, particularly if patient has any risk of bleeding.  It is unclear whether she had any blood loss with this episode as her history is not suggestive but reportedly she does have a prior history of GI bleeding.      4. Anemia:  Patient was profoundly anemic on admit, and I would presume she is receiving outpatient LAURA therapy.  Her ELIEZER indicates that she was taking phenazopyridine 200 mg 3 times daily on a regular schedule as an outpatient.  This medication is contraindicated with renal impairment as it may promote renal injury as well as being described to cause a hemolytic anemia.  Patient's iron stores appear to be adequate, and hemoglobin appears to be stable post transfusion    5. Polypharmacy:  Patient is admit medication record indicates significant polypharmacy, and an opportunity to improve drug dosing consistent with her renal function.  Of note Pepcid has been reduced to 20 mg once daily, and as outlined above Pyridium is contraindicated.  I would also strongly consider discontinuation of outpatient anticoagulation and vitamin-C given her other supplements.  Iron stores appear to be adequate and would be monitored at dialysis so discontinuation of her oral iron supplement would seem reasonable.  Additionally it appears she is receiving scheduled hydroxyzine 4 times daily as well as Claritin and trazodone which has anticholinergic properties as well.  I would consider discontinuing the hydroxyzine as this may contribute to confusion, constipation, and sedation.  Lastly I would also consider reducing atorvastatin to 40 mg daily, with statin therapy not really demonstrating significant efficacy in multiple trials with ESRD patients.      Thank you for your consult.     Martin Madison MD  Nephrology  [facility]

## 2024-01-25 PROBLEM — I48.91 ATRIAL FIBRILLATION WITH RVR: Status: ACTIVE | Noted: 2024-01-25

## 2024-01-25 LAB
ANION GAP SERPL CALC-SCNC: 5 MEQ/L (ref 2–13)
APPEARANCE UR: CLEAR
APTT PPP: 27.9 SECONDS (ref 23–29.4)
BACTERIA #/AREA URNS AUTO: ABNORMAL /HPF
BASOPHILS # BLD AUTO: 0.05 X10(3)/MCL (ref 0.01–0.08)
BASOPHILS # BLD AUTO: 0.06 X10(3)/MCL (ref 0.01–0.08)
BASOPHILS NFR BLD AUTO: 0.7 % (ref 0.1–1.2)
BASOPHILS NFR BLD AUTO: 0.7 % (ref 0.1–1.2)
BILIRUB UR QL STRIP.AUTO: NEGATIVE
BUN SERPL-MCNC: 42 MG/DL (ref 7–20)
CALCIUM SERPL-MCNC: 8.5 MG/DL (ref 8.4–10.2)
CHLORIDE SERPL-SCNC: 100 MMOL/L (ref 98–110)
CK SERPL-CCNC: 82 U/L (ref 30–135)
CO2 SERPL-SCNC: 30 MMOL/L (ref 21–32)
COLOR UR AUTO: YELLOW
CREAT SERPL-MCNC: 3.52 MG/DL (ref 0.66–1.25)
CREAT/UREA NIT SERPL: 12 (ref 12–20)
EOSINOPHIL # BLD AUTO: 0.4 X10(3)/MCL (ref 0.04–0.36)
EOSINOPHIL # BLD AUTO: 0.41 X10(3)/MCL (ref 0.04–0.36)
EOSINOPHIL NFR BLD AUTO: 4.9 % (ref 0.7–7)
EOSINOPHIL NFR BLD AUTO: 5.3 % (ref 0.7–7)
ERYTHROCYTE [DISTWIDTH] IN BLOOD BY AUTOMATED COUNT: 16.1 % (ref 11–14.5)
ERYTHROCYTE [DISTWIDTH] IN BLOOD BY AUTOMATED COUNT: 16.5 % (ref 11–14.5)
GFR SERPLBLD CREATININE-BSD FMLA CKD-EPI: 14 MLS/MIN/1.73/M2
GLUCOSE SERPL-MCNC: 92 MG/DL (ref 70–115)
GLUCOSE UR QL STRIP.AUTO: NEGATIVE
HCT VFR BLD AUTO: 27.7 % (ref 36–48)
HCT VFR BLD AUTO: 31.1 % (ref 36–48)
HGB BLD-MCNC: 10.4 G/DL (ref 11.8–16)
HGB BLD-MCNC: 9.3 G/DL (ref 11.8–16)
IMM GRANULOCYTES # BLD AUTO: 0.03 X10(3)/MCL (ref 0–0.03)
IMM GRANULOCYTES # BLD AUTO: 0.05 X10(3)/MCL (ref 0–0.03)
IMM GRANULOCYTES NFR BLD AUTO: 0.4 % (ref 0–0.5)
IMM GRANULOCYTES NFR BLD AUTO: 0.7 % (ref 0–0.5)
INR PPP: 1.1
KETONES UR QL STRIP.AUTO: NEGATIVE
LEUKOCYTE ESTERASE UR QL STRIP.AUTO: ABNORMAL
LYMPHOCYTES # BLD AUTO: 1.32 X10(3)/MCL (ref 1.16–3.74)
LYMPHOCYTES # BLD AUTO: 1.95 X10(3)/MCL (ref 1.16–3.74)
LYMPHOCYTES NFR BLD AUTO: 15.7 % (ref 20–55)
LYMPHOCYTES NFR BLD AUTO: 25.7 % (ref 20–55)
MAGNESIUM SERPL-MCNC: 1.9 MG/DL (ref 1.8–2.4)
MCH RBC QN AUTO: 31 PG (ref 27–34)
MCH RBC QN AUTO: 31.2 PG (ref 27–34)
MCHC RBC AUTO-ENTMCNC: 33.4 G/DL (ref 31–37)
MCHC RBC AUTO-ENTMCNC: 33.6 G/DL (ref 31–37)
MCV RBC AUTO: 92.3 FL (ref 79–99)
MCV RBC AUTO: 93.4 FL (ref 79–99)
MONOCYTES # BLD AUTO: 0.79 X10(3)/MCL (ref 0.24–0.36)
MONOCYTES # BLD AUTO: 0.86 X10(3)/MCL (ref 0.24–0.36)
MONOCYTES NFR BLD AUTO: 10.2 % (ref 4.7–12.5)
MONOCYTES NFR BLD AUTO: 10.4 % (ref 4.7–12.5)
NEUTROPHILS # BLD AUTO: 4.36 X10(3)/MCL (ref 1.56–6.13)
NEUTROPHILS # BLD AUTO: 5.75 X10(3)/MCL (ref 1.56–6.13)
NEUTROPHILS NFR BLD AUTO: 57.2 % (ref 37–73)
NEUTROPHILS NFR BLD AUTO: 68.1 % (ref 37–73)
NITRITE UR QL STRIP.AUTO: NEGATIVE
NRBC BLD AUTO-RTO: 0 %
NRBC BLD AUTO-RTO: 0 %
PH UR STRIP.AUTO: 7.5 [PH]
PLATELET # BLD AUTO: 169 X10(3)/MCL (ref 140–371)
PLATELET # BLD AUTO: 206 X10(3)/MCL (ref 140–371)
PMV BLD AUTO: 8.3 FL (ref 9.4–12.4)
PMV BLD AUTO: 8.9 FL (ref 9.4–12.4)
POCT GLUCOSE: 144 MG/DL (ref 70–110)
POCT GLUCOSE: 185 MG/DL (ref 70–110)
POCT GLUCOSE: 221 MG/DL (ref 70–110)
POCT GLUCOSE: 250 MG/DL (ref 70–110)
POTASSIUM SERPL-SCNC: 3.6 MMOL/L (ref 3.5–5.1)
PROT UR QL STRIP.AUTO: 30
PROTHROMBIN TIME: 11 SECONDS (ref 9.3–11.9)
RBC # BLD AUTO: 3 X10(6)/MCL (ref 4–5.1)
RBC # BLD AUTO: 3.33 X10(6)/MCL (ref 4–5.1)
RBC #/AREA URNS AUTO: ABNORMAL /HPF
RBC UR QL AUTO: ABNORMAL
SODIUM SERPL-SCNC: 135 MMOL/L (ref 135–145)
SP GR UR STRIP.AUTO: 1.01 (ref 1–1.03)
SQUAMOUS #/AREA URNS AUTO: ABNORMAL /HPF
TROPONIN I SERPL-MCNC: 5.4 NG/ML (ref 0–0.03)
UROBILINOGEN UR STRIP-ACNC: 0.2
WBC # SPEC AUTO: 7.6 X10(3)/MCL (ref 4–11.5)
WBC # SPEC AUTO: 8.43 X10(3)/MCL (ref 4–11.5)
WBC #/AREA URNS AUTO: ABNORMAL /HPF

## 2024-01-25 PROCEDURE — 93005 ELECTROCARDIOGRAM TRACING: CPT

## 2024-01-25 PROCEDURE — 25000003 PHARM REV CODE 250: Performed by: HOSPITALIST

## 2024-01-25 PROCEDURE — 80048 BASIC METABOLIC PNL TOTAL CA: CPT | Performed by: HOSPITALIST

## 2024-01-25 PROCEDURE — 63600175 PHARM REV CODE 636 W HCPCS: Performed by: FAMILY MEDICINE

## 2024-01-25 PROCEDURE — 36415 COLL VENOUS BLD VENIPUNCTURE: CPT | Performed by: INTERNAL MEDICINE

## 2024-01-25 PROCEDURE — 25000003 PHARM REV CODE 250: Performed by: FAMILY MEDICINE

## 2024-01-25 PROCEDURE — 82550 ASSAY OF CK (CPK): CPT | Performed by: INTERNAL MEDICINE

## 2024-01-25 PROCEDURE — 85025 COMPLETE CBC W/AUTO DIFF WBC: CPT | Performed by: INTERNAL MEDICINE

## 2024-01-25 PROCEDURE — 87086 URINE CULTURE/COLONY COUNT: CPT | Performed by: FAMILY MEDICINE

## 2024-01-25 PROCEDURE — 83735 ASSAY OF MAGNESIUM: CPT | Performed by: INTERNAL MEDICINE

## 2024-01-25 PROCEDURE — 85730 THROMBOPLASTIN TIME PARTIAL: CPT | Performed by: INTERNAL MEDICINE

## 2024-01-25 PROCEDURE — 21400001 HC TELEMETRY ROOM

## 2024-01-25 PROCEDURE — 63600175 PHARM REV CODE 636 W HCPCS: Performed by: INTERNAL MEDICINE

## 2024-01-25 PROCEDURE — 94761 N-INVAS EAR/PLS OXIMETRY MLT: CPT

## 2024-01-25 PROCEDURE — 81003 URINALYSIS AUTO W/O SCOPE: CPT | Performed by: FAMILY MEDICINE

## 2024-01-25 PROCEDURE — 93010 ELECTROCARDIOGRAM REPORT: CPT | Mod: 76,,, | Performed by: INTERNAL MEDICINE

## 2024-01-25 PROCEDURE — 84484 ASSAY OF TROPONIN QUANT: CPT | Performed by: INTERNAL MEDICINE

## 2024-01-25 PROCEDURE — 85025 COMPLETE CBC W/AUTO DIFF WBC: CPT | Performed by: FAMILY MEDICINE

## 2024-01-25 PROCEDURE — 85610 PROTHROMBIN TIME: CPT | Performed by: INTERNAL MEDICINE

## 2024-01-25 PROCEDURE — 11000001 HC ACUTE MED/SURG PRIVATE ROOM

## 2024-01-25 RX ORDER — METOPROLOL TARTRATE 1 MG/ML
2.5 INJECTION, SOLUTION INTRAVENOUS EVERY 6 HOURS PRN
Status: DISCONTINUED | OUTPATIENT
Start: 2024-01-25 | End: 2024-01-26 | Stop reason: HOSPADM

## 2024-01-25 RX ORDER — METOPROLOL TARTRATE 50 MG/1
50 TABLET ORAL 2 TIMES DAILY
Status: DISCONTINUED | OUTPATIENT
Start: 2024-01-25 | End: 2024-01-25

## 2024-01-25 RX ORDER — POTASSIUM CHLORIDE 20 MEQ/1
20 TABLET, EXTENDED RELEASE ORAL DAILY
Status: DISCONTINUED | OUTPATIENT
Start: 2024-01-25 | End: 2024-01-26 | Stop reason: HOSPADM

## 2024-01-25 RX ORDER — ATORVASTATIN CALCIUM 40 MG/1
40 TABLET, FILM COATED ORAL DAILY
Status: DISCONTINUED | OUTPATIENT
Start: 2024-01-26 | End: 2024-01-26 | Stop reason: HOSPADM

## 2024-01-25 RX ORDER — HEPARIN SODIUM,PORCINE/D5W 25000/250
0-40 INTRAVENOUS SOLUTION INTRAVENOUS CONTINUOUS
Status: DISCONTINUED | OUTPATIENT
Start: 2024-01-25 | End: 2024-01-26 | Stop reason: HOSPADM

## 2024-01-25 RX ORDER — ASPIRIN 325 MG
325 TABLET, DELAYED RELEASE (ENTERIC COATED) ORAL ONCE
Status: COMPLETED | OUTPATIENT
Start: 2024-01-26 | End: 2024-01-25

## 2024-01-25 RX ORDER — ASPIRIN 325 MG
325 TABLET, DELAYED RELEASE (ENTERIC COATED) ORAL DAILY
Status: DISCONTINUED | OUTPATIENT
Start: 2024-01-26 | End: 2024-01-26 | Stop reason: HOSPADM

## 2024-01-25 RX ADMIN — MUPIROCIN: 20 OINTMENT TOPICAL at 10:01

## 2024-01-25 RX ADMIN — HEPARIN SODIUM 40 UNITS/KG/HR: 10000 INJECTION, SOLUTION INTRAVENOUS at 10:01

## 2024-01-25 RX ADMIN — ASPIRIN 325 MG: 325 TABLET, COATED ORAL at 11:01

## 2024-01-25 RX ADMIN — Medication: at 09:01

## 2024-01-25 RX ADMIN — DILTIAZEM HYDROCHLORIDE 120 MG: 30 TABLET, FILM COATED ORAL at 04:01

## 2024-01-25 RX ADMIN — MUPIROCIN: 20 OINTMENT TOPICAL at 08:01

## 2024-01-25 RX ADMIN — MICONAZOLE NITRATE ANTIFUNGAL POWDER: 2 POWDER TOPICAL at 09:01

## 2024-01-25 RX ADMIN — TRAZODONE HYDROCHLORIDE 50 MG: 50 TABLET ORAL at 09:01

## 2024-01-25 RX ADMIN — Medication: at 10:01

## 2024-01-25 RX ADMIN — FLUOXETINE 10 MG: 10 CAPSULE ORAL at 10:01

## 2024-01-25 RX ADMIN — MICONAZOLE NITRATE ANTIFUNGAL POWDER: 2 POWDER TOPICAL at 10:01

## 2024-01-25 RX ADMIN — FAMOTIDINE 20 MG: 10 INJECTION, SOLUTION INTRAVENOUS at 10:01

## 2024-01-25 RX ADMIN — POTASSIUM CHLORIDE 20 MEQ: 1500 TABLET, EXTENDED RELEASE ORAL at 03:01

## 2024-01-25 RX ADMIN — INSULIN DETEMIR 20 UNITS: 100 INJECTION, SOLUTION SUBCUTANEOUS at 08:01

## 2024-01-25 RX ADMIN — CEFTRIAXONE SODIUM 1 G: 1 INJECTION, POWDER, FOR SOLUTION INTRAMUSCULAR; INTRAVENOUS at 03:01

## 2024-01-25 RX ADMIN — METOPROLOL TARTRATE 50 MG: 50 TABLET, FILM COATED ORAL at 04:01

## 2024-01-25 NOTE — PLAN OF CARE
REVIEWED.  Problem: Adult Inpatient Plan of Care  Goal: Plan of Care Review  Outcome: Ongoing, Progressing  Goal: Patient-Specific Goal (Individualized)  Outcome: Ongoing, Progressing  Goal: Absence of Hospital-Acquired Illness or Injury  Outcome: Ongoing, Progressing  Goal: Optimal Comfort and Wellbeing  Outcome: Ongoing, Progressing  Goal: Readiness for Transition of Care  Outcome: Ongoing, Progressing     Problem: Infection  Goal: Absence of Infection Signs and Symptoms  Outcome: Ongoing, Progressing     Problem: Skin Injury Risk Increased  Goal: Skin Health and Integrity  Outcome: Ongoing, Progressing     Problem: Fall Injury Risk  Goal: Absence of Fall and Fall-Related Injury  Outcome: Ongoing, Progressing     Problem: Impaired Wound Healing  Goal: Optimal Wound Healing  Outcome: Ongoing, Progressing

## 2024-01-25 NOTE — PLAN OF CARE
Problem: Adult Inpatient Plan of Care  Goal: Plan of Care Review  Outcome: Ongoing, Progressing  Goal: Patient-Specific Goal (Individualized)  Outcome: Ongoing, Progressing  Goal: Absence of Hospital-Acquired Illness or Injury  Outcome: Ongoing, Progressing  Goal: Optimal Comfort and Wellbeing  Outcome: Ongoing, Progressing  Goal: Readiness for Transition of Care  Outcome: Ongoing, Progressing     Problem: Infection  Goal: Absence of Infection Signs and Symptoms  Outcome: Ongoing, Progressing     Problem: Skin Injury Risk Increased  Goal: Skin Health and Integrity  Outcome: Ongoing, Progressing  Intervention: Optimize Skin Protection  Flowsheets (Taken 1/24/2024 2140)  Pressure Reduction Techniques: frequent weight shift encouraged  Pressure Reduction Devices: foam padding utilized  Head of Bed (HOB) Positioning: HOB at 30-45 degrees  Intervention: Promote and Optimize Oral Intake  Flowsheets (Taken 1/24/2024 2140)  Oral Nutrition Promotion: rest periods promoted     Problem: Fall Injury Risk  Goal: Absence of Fall and Fall-Related Injury  Outcome: Ongoing, Progressing  Intervention: Promote Injury-Free Environment  Flowsheets (Taken 1/24/2024 2140)  Safety Promotion/Fall Prevention: bed alarm set     Problem: Impaired Wound Healing  Goal: Optimal Wound Healing  Outcome: Ongoing, Progressing  Intervention: Promote Wound Healing  Flowsheets (Taken 1/24/2024 2140)  Oral Nutrition Promotion: rest periods promoted  Activity Management: Rolling - L1

## 2024-01-25 NOTE — PROGRESS NOTES
Hospital Medicine  Progress Note    Patient Name: Elvira Caldwell  MRN: [id number]  Status: IP- Inpatient   Admission Date: 1/23/2024  Length of Stay: 2  Date of Service: 01/25/2024       CC: hospital follow-up for        SUBJECTIVE   68 y.o. female with hx of Paroxysmal Afib, ESRD on HD, DMII, Depression sent to ER after HD today with NV and not feeling well during and after HD.  After HD finished she was noted to be Hypotensive and got fluid back.  On arrival to ER was found to be in Afib with RVR and as well as hypotensive and anemic.  No reports of blood loss that she is aware.        01/24/2024  Lab improved  BP better  Back in NSR this AM  Evaluated by cardio plan stop cardizem drip resume home meds  Monitor overnight  Watch BP closely      01/25/2024  Feel better today  Good UO  Renal fxn trend up some 42 & 3.52  HR well controlled on po cardizem  Urinalysis pending     Today: Patient seen and examined at bedside, and chart reviewed.       MEDICATIONS   Scheduled   diltiaZEM  120 mg Oral Daily    famotidine (PF)  20 mg Intravenous Daily    FLUoxetine  10 mg Oral Daily    insulin detemir U-100 (Levemir)  20 Units Subcutaneous QHS    miconazole NITRATE 2 %   Topical (Top) BID    mupirocin   Nasal BID    Questran and Aquaphor Topical Compound   Topical (Top) BID    traZODone  50 mg Oral QHS     Continuous Infusions   dilTIAZem Stopped (01/24/24 0909)         PHYSICAL EXAM   VITALS: T 97.1 °F (36.2 °C)   /62   P 80   RR 15   O2 97 %    GENERAL: Awake and in NAD  LUNGS: CTA B/L  CVS: Normal rate  GI/: Soft, NT, bowel sounds positive.  EXTREMITIES: No peripheral edema  NEURO: AAOx3  PSYCH: Cooperative      LABS   CBC  Recent Labs     01/24/24  0342 01/25/24  0353   WBC 8.46 7.60   RBC 2.89* 3.00*   HGB 8.9* 9.3*   HCT 26.2* 27.7*   MCV 90.7 92.3   MCH 30.8 31.0   MCHC 34.0 33.6   RDW 16.3* 16.5*    169     CHEM  Recent Labs     01/23/24  1505 01/24/24  0342 01/25/24  0354   * 134* 135   K 3.1* 3.7  3.6   CHLORIDE 99 99 100   CO2 24 29 30   BUN 28.0* 37.0* 42.0*   CREATININE 2.05* 2.69* 3.52*   GLUCOSE 94 147* 92   CALCIUM 7.4* 7.9* 8.5   MG 1.40*  --   --    ALBUMIN 2.8*  --   --    GLOBULIN 3.1  --   --    ALKPHOS 136  --   --    ALT 40  --   --    AST 48*  --   --    BILITOT 0.4  --   --    IRON 117  --   --    TIBC 180*  --   --          MICROBIOLOGY     Microbiology Results (last 7 days)       ** No results found for the last 168 hours. **              DIAGNOSTICS   X-Ray Toe 2 or More Views Left  RIGHT FOOT (3 VIEWS):     HISTORY: M86.10  OSTEOMYELITIS     COMPARISON: None available     FINDINGS: AP, oblique and lateral views of the right foot reveal  dislocation of the first metatarsophalangeal joint. The first proximal  phalanx is anterior medial to the head of the first metatarsal. Bony  structures are osteopenic. There is persistent flexion/extension of  the pelvis. No obvious fracture is seen. Small enthesophyte formation  is evident at the posterior and inferior calcaneus.     IMPRESSION:  1. Dislocation at the first metatarsophalangeal joint  2. Osteopenia  3. Persistent flexion/extension of the toes  3. Small enthesophyte formation at the calcaneus      Electronically Signed By: Scott MUELLER, [place]  Date/Time Signed: 09/22/2021 16:38        ASSESSMENT/PLAN:     Problem: Afib w RVR  1/24/2024  NSR this AM  Spoke with cardio plan- stop cardizem drip  Resume home cardizem  mg daily  Hold metoprolol for now and will follow     1/25/2024  NSR   On po cardizem  Cardio follow cont to hold metoprolol for now      Differential Dx:NA  Chronic issues affecting care: ESRD  Radiology reports reviewed and/or personal interpretation: as above   Tests considered or ordered: labs, imaging   Plan of care: Now on Cardizem infusion and admit to ICU for better control and titration, should improve with fluids and blood.       Problem: Acute blood loss anemia   1/24/2024  H/H trend up and stable s/p 2 u  pRBC  Occult stool pending  Suspect multifactorial      1/25/2024  H/h stable     Differential DX: NA  Chronic issues affecting care: ESRD  Radiology reports reviewed and/or personal interpretation: as above   Tests considered or ordered: labs  Plan of care: No obvious source of bleeding but suspect ESRD is contributing.  Iron studies pendin.  T/M for 2 units of PRBCs.       Problem: Hypotension   Differential DX: blood loss vs volume contraction vs HD   Chronic issues affecting care: ESRD   Radiology reports reviewed and/or personal interpretation: as above   Tests considered or ordered: labs   Plan of care: Has responded to IVFs and blood, monitor closely on Cardizem      1/25/2024 improved    Problem: ESRD on HD  Plan of care: Got HD done today but will consult Nephrology in case of fluid overload.   1/24/2024  Eval by nephrology- dose not appear fluid overload  Bun/CR stable recently down to 1 HD sesssion per week  Plan no HD tomorrow  Will follow lab UO, volume status although dose not appear volume overloaded at this time      1/25/2024  F/u nephrology rec's     Problem: Hypokalemia   Plan of care: Replaced in ER and monitor      Problem: Hypomagnesemia   Plan of care: Replaced and monitor      1/25/2024  Improved 3.6 today      Problem: UTI  Present on admission   1/25/2024 iv rocephin  F/u urine cultures        Chronic medical issues addressed during this admission and plan of care:  HTN - holding home meds           Cruz Jacobs MD  [facility]

## 2024-01-25 NOTE — NURSING TRANSFER
Nursing Transfer Note      1/25/2024   2:52 PM    Nurse giving handoff: LESLI, RN  Nurse receiving handoff: ALICIA PORTILLO    Reason patient is being transferred: PHYSICIAN ORDERED    Transfer To: [id number]    Transfer via bed    Transfer with cardiac monitoring    Transported by SIMIN ANDUJAR RN        Telemetry: Box Number [id number]  Order for Tele Monitor? Yes        4eyes on Skin: yes    Medicines sent: YES - INSULIN PEN    Any special needs or follow-up needed: NONE    Patient belongings transferred with patient: Yes    Chart send with patient: Yes    Notified: son    Patient reassessed at: 1/25/24, 1452 (date, time)  1  Upon arrival to floor: cardiac monitor applied, patient oriented to room, call bell in reach, and bed in lowest position

## 2024-01-26 VITALS
WEIGHT: 158.5 LBS | OXYGEN SATURATION: 100 % | HEART RATE: 88 BPM | DIASTOLIC BLOOD PRESSURE: 70 MMHG | RESPIRATION RATE: 20 BRPM | HEIGHT: 69 IN | TEMPERATURE: 98 F | BODY MASS INDEX: 23.47 KG/M2 | SYSTOLIC BLOOD PRESSURE: 165 MMHG

## 2024-01-26 PROBLEM — R79.89 ELEVATED TROPONIN: Status: ACTIVE | Noted: 2024-01-26

## 2024-01-26 PROBLEM — N18.4 CHRONIC KIDNEY DISEASE (CKD), ACTIVE MEDICAL MANAGEMENT WITHOUT DIALYSIS, STAGE 4 (SEVERE): Status: ACTIVE | Noted: 2024-01-26

## 2024-01-26 PROBLEM — I10 HTN (HYPERTENSION): Status: ACTIVE | Noted: 2024-01-26

## 2024-01-26 PROBLEM — I44.2 THIRD DEGREE AV BLOCK: Status: ACTIVE | Noted: 2024-01-26

## 2024-01-26 LAB
ANION GAP SERPL CALC-SCNC: 9 MEQ/L (ref 2–13)
AORTIC VALVE CUSP SEPERATION: 1.56 CM
APTT PPP: >121 SECONDS
ASCENDING AORTA: 2.57 CM
AV INDEX (PROSTH): 0.7
AV MEAN GRADIENT: 6 MMHG
AV PEAK GRADIENT: 11 MMHG
AV VALVE AREA BY VELOCITY RATIO: 1.16 CM²
AV VALVE AREA: 1.27 CM²
AV VELOCITY RATIO: 0.64
BASOPHILS # BLD AUTO: 0.07 X10(3)/MCL (ref 0.01–0.08)
BASOPHILS NFR BLD AUTO: 0.9 % (ref 0.1–1.2)
BNP BLD-MCNC: ABNORMAL PG/ML (ref 0–124.9)
BSA FOR ECHO PROCEDURE: 1.87 M2
BUN SERPL-MCNC: 44 MG/DL (ref 7–20)
CALCIUM SERPL-MCNC: 8.5 MG/DL (ref 8.4–10.2)
CHLORIDE SERPL-SCNC: 101 MMOL/L (ref 98–110)
CO2 SERPL-SCNC: 24 MMOL/L (ref 21–32)
CREAT SERPL-MCNC: 3.9 MG/DL (ref 0.66–1.25)
CREAT/UREA NIT SERPL: 11 (ref 12–20)
CV ECHO LV RWT: 0.53 CM
DOP CALC AO PEAK VEL: 1.66 M/S
DOP CALC AO VTI: 34 CM
DOP CALC LVOT AREA: 1.8 CM2
DOP CALC LVOT DIAMETER: 1.52 CM
DOP CALC LVOT PEAK VEL: 1.06 M/S
DOP CALC LVOT STROKE VOLUME: 43.35 CM3
DOP CALCLVOT PEAK VEL VTI: 23.9 CM
E WAVE DECELERATION TIME: 150 MSEC
E/A RATIO: 0.91
E/E' RATIO: 10.53 M/S
ECHO LV POSTERIOR WALL: 1.11 CM (ref 0.6–1.1)
EOSINOPHIL # BLD AUTO: 0.78 X10(3)/MCL (ref 0.04–0.36)
EOSINOPHIL NFR BLD AUTO: 9.5 % (ref 0.7–7)
ERYTHROCYTE [DISTWIDTH] IN BLOOD BY AUTOMATED COUNT: 15.8 % (ref 11–14.5)
FRACTIONAL SHORTENING: 37 % (ref 28–44)
GFR SERPLBLD CREATININE-BSD FMLA CKD-EPI: 12 MLS/MIN/1.73/M2
GLUCOSE SERPL-MCNC: 95 MG/DL (ref 70–115)
HCT VFR BLD AUTO: 28.8 % (ref 36–48)
HEMOCCULT SP1 STL QL: POSITIVE
HGB BLD-MCNC: 9.3 G/DL (ref 11.8–16)
IMM GRANULOCYTES # BLD AUTO: 0.06 X10(3)/MCL (ref 0–0.03)
IMM GRANULOCYTES NFR BLD AUTO: 0.7 % (ref 0–0.5)
INFERIOR VENA CAVA SIZE SNIFF: 0.5 CM
INTERVENTRICULAR SEPTUM: 0.99 CM (ref 0.6–1.1)
IVC DIAMETER: 1.5 CM
LEFT ATRIUM SIZE: 3.72 CM
LEFT ATRIUM VOLUME INDEX MOD: 19.5 ML/M2
LEFT ATRIUM VOLUME MOD: 36.5 CM3
LEFT INTERNAL DIMENSION IN SYSTOLE: 2.63 CM (ref 2.1–4)
LEFT VENTRICLE DIASTOLIC VOLUME INDEX: 41.07 ML/M2
LEFT VENTRICLE DIASTOLIC VOLUME: 76.8 ML
LEFT VENTRICLE MASS INDEX: 77 G/M2
LEFT VENTRICLE SYSTOLIC VOLUME INDEX: 13.5 ML/M2
LEFT VENTRICLE SYSTOLIC VOLUME: 25.3 ML
LEFT VENTRICULAR INTERNAL DIMENSION IN DIASTOLE: 4.16 CM (ref 3.5–6)
LEFT VENTRICULAR MASS: 144.8 G
LV LATERAL E/E' RATIO: 11.11 M/S
LV SEPTAL E/E' RATIO: 10 M/S
LVOT MG: 2.1 MMHG
LVOT MV: 0.67 CM/S
LYMPHOCYTES # BLD AUTO: 2.52 X10(3)/MCL (ref 1.16–3.74)
LYMPHOCYTES NFR BLD AUTO: 30.7 % (ref 20–55)
MAGNESIUM SERPL-MCNC: 2 MG/DL (ref 1.8–2.4)
MCH RBC QN AUTO: 29.9 PG (ref 27–34)
MCHC RBC AUTO-ENTMCNC: 32.3 G/DL (ref 31–37)
MCV RBC AUTO: 92.6 FL (ref 79–99)
MONOCYTES # BLD AUTO: 0.82 X10(3)/MCL (ref 0.24–0.36)
MONOCYTES NFR BLD AUTO: 10 % (ref 4.7–12.5)
MV PEAK A VEL: 1.1 M/S
MV PEAK E VEL: 1 M/S
NEUTROPHILS # BLD AUTO: 3.96 X10(3)/MCL (ref 1.56–6.13)
NEUTROPHILS NFR BLD AUTO: 48.2 % (ref 37–73)
NRBC BLD AUTO-RTO: 0 %
PISA TR MAX VEL: 1.77 M/S
PLATELET # BLD AUTO: 175 X10(3)/MCL (ref 140–371)
PMV BLD AUTO: 8.3 FL (ref 9.4–12.4)
POCT GLUCOSE: 172 MG/DL (ref 70–110)
POCT GLUCOSE: 185 MG/DL (ref 70–110)
POCT GLUCOSE: 206 MG/DL (ref 70–110)
POCT GLUCOSE: 80 MG/DL (ref 70–110)
POCT GLUCOSE: 91 MG/DL (ref 70–110)
POTASSIUM SERPL-SCNC: 3.7 MMOL/L (ref 3.5–5.1)
RA MAJOR: 4 CM
RA PRESSURE ESTIMATED: 3 MMHG
RBC # BLD AUTO: 3.11 X10(6)/MCL (ref 4–5.1)
RIGHT ATRIAL AREA: 8.2 CM2
RIGHT ATRIUM VOLUME AREA LENGTH APICAL 4 CHAMBER: 14.6 ML
RIGHT VENTRICULAR END-DIASTOLIC DIMENSION: 2.95 CM
RV TB RVSP: 5 MMHG
SODIUM SERPL-SCNC: 134 MMOL/L (ref 135–145)
TDI LATERAL: 0.09 M/S
TDI SEPTAL: 0.1 M/S
TDI: 0.1 M/S
TR MAX PG: 13 MMHG
TROPONIN I SERPL-MCNC: 3.97 NG/ML (ref 0–0.03)
TROPONIN I SERPL-MCNC: 4.97 NG/ML (ref 0–0.03)
TROPONIN I SERPL-MCNC: 5.28 NG/ML (ref 0–0.03)
TV REST PULMONARY ARTERY PRESSURE: 16 MMHG
WBC # SPEC AUTO: 8.21 X10(3)/MCL (ref 4–11.5)
Z-SCORE OF LEFT VENTRICULAR DIMENSION IN END DIASTOLE: -2.26
Z-SCORE OF LEFT VENTRICULAR DIMENSION IN END SYSTOLE: -1.59

## 2024-01-26 PROCEDURE — 36415 COLL VENOUS BLD VENIPUNCTURE: CPT | Performed by: FAMILY MEDICINE

## 2024-01-26 PROCEDURE — 82272 OCCULT BLD FECES 1-3 TESTS: CPT | Performed by: FAMILY MEDICINE

## 2024-01-26 PROCEDURE — 25000003 PHARM REV CODE 250: Performed by: INTERNAL MEDICINE

## 2024-01-26 PROCEDURE — 63600175 PHARM REV CODE 636 W HCPCS: Performed by: INTERNAL MEDICINE

## 2024-01-26 PROCEDURE — 25000003 PHARM REV CODE 250: Performed by: FAMILY MEDICINE

## 2024-01-26 PROCEDURE — 84484 ASSAY OF TROPONIN QUANT: CPT | Performed by: INTERNAL MEDICINE

## 2024-01-26 PROCEDURE — 93010 ELECTROCARDIOGRAM REPORT: CPT | Mod: ,,, | Performed by: INTERNAL MEDICINE

## 2024-01-26 PROCEDURE — 85730 THROMBOPLASTIN TIME PARTIAL: CPT | Performed by: FAMILY MEDICINE

## 2024-01-26 PROCEDURE — 84484 ASSAY OF TROPONIN QUANT: CPT | Performed by: FAMILY MEDICINE

## 2024-01-26 PROCEDURE — 83735 ASSAY OF MAGNESIUM: CPT | Performed by: FAMILY MEDICINE

## 2024-01-26 PROCEDURE — 93005 ELECTROCARDIOGRAM TRACING: CPT

## 2024-01-26 PROCEDURE — 83880 ASSAY OF NATRIURETIC PEPTIDE: CPT | Performed by: FAMILY MEDICINE

## 2024-01-26 PROCEDURE — 85025 COMPLETE CBC W/AUTO DIFF WBC: CPT | Performed by: FAMILY MEDICINE

## 2024-01-26 PROCEDURE — 25000003 PHARM REV CODE 250: Performed by: NURSE PRACTITIONER

## 2024-01-26 PROCEDURE — 25000003 PHARM REV CODE 250: Performed by: HOSPITALIST

## 2024-01-26 PROCEDURE — 80048 BASIC METABOLIC PNL TOTAL CA: CPT | Performed by: HOSPITALIST

## 2024-01-26 PROCEDURE — 63600175 PHARM REV CODE 636 W HCPCS: Performed by: FAMILY MEDICINE

## 2024-01-26 PROCEDURE — 36415 COLL VENOUS BLD VENIPUNCTURE: CPT | Performed by: INTERNAL MEDICINE

## 2024-01-26 RX ORDER — CLOPIDOGREL BISULFATE 75 MG/1
600 TABLET ORAL ONCE
Status: COMPLETED | OUTPATIENT
Start: 2024-01-26 | End: 2024-01-26

## 2024-01-26 RX ORDER — CLOPIDOGREL BISULFATE 75 MG/1
75 TABLET ORAL DAILY
Status: DISCONTINUED | OUTPATIENT
Start: 2024-01-27 | End: 2024-01-26 | Stop reason: HOSPADM

## 2024-01-26 RX ADMIN — POTASSIUM CHLORIDE 20 MEQ: 1500 TABLET, EXTENDED RELEASE ORAL at 08:01

## 2024-01-26 RX ADMIN — MICONAZOLE NITRATE ANTIFUNGAL POWDER: 2 POWDER TOPICAL at 08:01

## 2024-01-26 RX ADMIN — ATORVASTATIN CALCIUM 40 MG: 40 TABLET, FILM COATED ORAL at 08:01

## 2024-01-26 RX ADMIN — Medication: at 08:01

## 2024-01-26 RX ADMIN — HEPARIN SODIUM 34 UNITS/KG/HR: 10000 INJECTION, SOLUTION INTRAVENOUS at 02:01

## 2024-01-26 RX ADMIN — TRAZODONE HYDROCHLORIDE 50 MG: 50 TABLET ORAL at 08:01

## 2024-01-26 RX ADMIN — HEPARIN SODIUM 34 UNITS/KG/HR: 10000 INJECTION, SOLUTION INTRAVENOUS at 07:01

## 2024-01-26 RX ADMIN — ASPIRIN 325 MG: 325 TABLET, COATED ORAL at 08:01

## 2024-01-26 RX ADMIN — HEPARIN SODIUM 34 UNITS/KG/HR: 10000 INJECTION, SOLUTION INTRAVENOUS at 08:01

## 2024-01-26 RX ADMIN — FLUOXETINE 10 MG: 10 CAPSULE ORAL at 08:01

## 2024-01-26 RX ADMIN — MUPIROCIN: 20 OINTMENT TOPICAL at 08:01

## 2024-01-26 RX ADMIN — CLOPIDOGREL BISULFATE 600 MG: 75 TABLET ORAL at 04:01

## 2024-01-26 RX ADMIN — HEPARIN SODIUM 37 UNITS/KG/HR: 10000 INJECTION, SOLUTION INTRAVENOUS at 07:01

## 2024-01-26 RX ADMIN — CEFTRIAXONE SODIUM 1 G: 1 INJECTION, POWDER, FOR SOLUTION INTRAMUSCULAR; INTRAVENOUS at 04:01

## 2024-01-26 RX ADMIN — FAMOTIDINE 20 MG: 10 INJECTION, SOLUTION INTRAVENOUS at 08:01

## 2024-01-26 NOTE — HOSPITAL COURSE
01/26/2024 patient resting comfortably in bed on telemetry.  Troponin elevated to 5.  [facility] has evaluated the patient recommended transfer for heart catheterization.  She had an episode complete heart block last night which has resolved he is currently in a normal sinus rhythm.  Work on transfer now to soon as possible.

## 2024-01-26 NOTE — ASSESSMENT & PLAN NOTE
Chronic, controlled. Latest blood pressure and vitals reviewed-     Temp:  [97.4 °F (36.3 °C)-98.2 °F (36.8 °C)]   Pulse:  []   Resp:  [18-20]   BP: ()/(51-82)   SpO2:  [95 %-100 %] .   Home meds for hypertension were reviewed and noted below.   Hypertension Medications               diltiaZEM (CARDIZEM) 120 MG tablet Take 120 mg by mouth Daily.    hydrALAZINE (APRESOLINE) 25 MG tablet Take 25 mg by mouth 3 (three) times daily.    metoprolol tartrate (LOPRESSOR) 50 MG tablet Take 50 mg by mouth 2 (two) times daily.            While in the hospital, will manage blood pressure as follows; Continue home antihypertensive regimen    Will utilize p.r.n. blood pressure medication only if patient's blood pressure greater than 180/110 and she develops symptoms such as worsening chest pain or shortness of breath.

## 2024-01-26 NOTE — ASSESSMENT & PLAN NOTE
As condition was transient we will continue to monitor very closely along with Cardiology.  Work on transfer for higher level of care and she may need a pacemaker.

## 2024-01-26 NOTE — SUBJECTIVE & OBJECTIVE
Interval History:     Review of Systems   Constitutional:  Positive for appetite change. Negative for activity change, chills, diaphoresis, fatigue and fever.   HENT:  Negative for congestion, ear pain, rhinorrhea and sore throat.    Eyes:  Negative for pain, discharge and redness.   Respiratory:  Positive for chest tightness and shortness of breath. Negative for apnea, cough, choking and wheezing.    Cardiovascular:  Negative for chest pain, palpitations and leg swelling.   Gastrointestinal:  Negative for abdominal distention, abdominal pain, blood in stool, constipation, diarrhea, nausea and vomiting.   Endocrine: Negative for cold intolerance, heat intolerance and polyuria.   Genitourinary:  Negative for difficulty urinating, dysuria, frequency, hematuria and pelvic pain.   Musculoskeletal:  Negative for arthralgias, back pain, gait problem, joint swelling and myalgias.   Skin:  Negative for color change, pallor, rash and wound.   Neurological:  Negative for seizures, syncope, speech difficulty, numbness and headaches.   Psychiatric/Behavioral:  Negative for agitation, confusion, hallucinations and sleep disturbance. The patient is not nervous/anxious.      Objective:     Vital Signs (Most Recent):  Temp: 97.4 °F (36.3 °C) (01/26/24 0701)  Pulse: 72 (01/26/24 0701)  Resp: 20 (01/26/24 0701)  BP: (!) 125/51 (01/26/24 0701)  SpO2: 98 % (01/26/24 0701) Vital Signs (24h Range):  Temp:  [97.4 °F (36.3 °C)-98.2 °F (36.8 °C)] 97.4 °F (36.3 °C)  Pulse:  [] 72  Resp:  [18-20] 20  SpO2:  [95 %-100 %] 98 %  BP: ()/(51-82) 125/51     Weight: 71.9 kg (158 lb 8.2 oz)  Body mass index is 23.41 kg/m².    Intake/Output Summary (Last 24 hours) at 1/26/2024 1320  Last data filed at 1/26/2024 0633  Gross per 24 hour   Intake 855.55 ml   Output 1550 ml   Net -694.45 ml         Physical Exam  Constitutional:       General: She is not in acute distress.     Appearance: Normal appearance. She is ill-appearing. She is not  toxic-appearing or diaphoretic.   HENT:      Head: Normocephalic and atraumatic.      Right Ear: External ear normal.      Left Ear: External ear normal.      Nose: Nose normal. No congestion or rhinorrhea.      Mouth/Throat:      Mouth: Mucous membranes are moist.      Pharynx: Oropharynx is clear.   Eyes:      Extraocular Movements: Extraocular movements intact.      Conjunctiva/sclera: Conjunctivae normal.      Pupils: Pupils are equal, round, and reactive to light.   Neck:      Vascular: No carotid bruit.   Cardiovascular:      Rate and Rhythm: Normal rate and regular rhythm.      Pulses: Normal pulses.      Heart sounds: Normal heart sounds. No murmur heard.  Pulmonary:      Effort: Pulmonary effort is normal. No respiratory distress.      Breath sounds: Rales present. No wheezing or rhonchi.   Abdominal:      General: Abdomen is flat. Bowel sounds are normal. There is no distension.      Palpations: Abdomen is soft.      Tenderness: There is no abdominal tenderness. There is no guarding.   Musculoskeletal:         General: No swelling or tenderness. Normal range of motion.      Cervical back: Normal range of motion and neck supple. No tenderness.      Right lower leg: No edema.      Left lower leg: No edema.   Lymphadenopathy:      Cervical: No cervical adenopathy.   Skin:     General: Skin is warm and dry.      Coloration: Skin is not jaundiced or pale.   Neurological:      General: No focal deficit present.      Mental Status: She is alert and oriented to person, place, and time. Mental status is at baseline.      Cranial Nerves: No cranial nerve deficit.      Motor: No weakness.      Coordination: Coordination normal.      Gait: Gait normal.   Psychiatric:         Mood and Affect: Mood normal.         Behavior: Behavior normal.         Thought Content: Thought content normal.         Judgment: Judgment normal.             Significant Labs: All pertinent labs within the past 24 hours have been  reviewed.    Significant Imaging: I have reviewed all pertinent imaging results/findings within the past 24 hours.

## 2024-01-26 NOTE — NURSING
Spoke with Dr. Victor about this patient & a consult for him;was never notified of this consult for this patient;will notify Dr. Cruz

## 2024-01-26 NOTE — PROGRESS NOTES
I was notified at the start of the shift that patient was in complete heart block on the monitor at about 6 45 pm. EKG confirmed this. Labs revealed a troponin of 5.4. Aspirin, statin and heparin drip were started. [facility] consultation was obtained. Transfer to [facility] was recommended and process has been initiated. We are awaiting bed. She is now in NSR. She has remained hemodynamically stable throughout.

## 2024-01-26 NOTE — CONSULTS
[facility]-Med/Surg    Cardiology  Consult Note    Patient Name: Elvira Caldwell  MRN: [id number]  Admission Date: 1/23/2024  Hospital Length of Stay: 3 days  Code Status: Full Code   Attending Provider: Cruz Jacobs MD   Consulting Provider: JAIRO Jeffers  Primary Care Physician: Chaparrita, Primary Doctor  Principal Problem:Atrial fibrillation with RVR    Patient information was obtained from patient, ER records, and primary team.     Subjective:     Chief Complaint:  Elevated Tn, PAF and CHB    HPI: Patient is a 68-year-old white female who was admitted to [facility] 1/23 with atrial fibrillation rapid ventricular response during dialysis.  The patient was treated with a diltiazem drip p.o. diltiazem and Lopressor and converted to sinus rhythm.  The patient was bradycardic and has developed third-degree heart block.  The patient also has an elevated troponin of 5.  EKG shows ST depression.  She is not having any chest pain or shortness of breath.  She stated when she was in atrial fibrillation with rapid ventricular response that she had chest pain.  The patient has no history of coronary artery disease and has had no testing.  The patient is currently hemodynamically stable chest pain-free on a heparin drip.     1/26: VSS, NAD. Denies CP or SOB. Was seen by telecardiology last night. No further heart block. Echo being performed and remains on Heparin gtt. Attempts being made for transfer. Has seen Dr Camacho in recent past at [facility]. Denies hx of PCI.   Hgb on admit 6 and transfused. OAC was Dcd.  Tn trended down. EKG improved     PMH:  End-stage renal disease on hemodialysis, hyperlipidemia hypertension.  PSH:  Noncontributory  Family History:  Noncontributory  Social History:  Does not smoke     Previous Cardiac Diagnostics:   None performed          History reviewed. No pertinent past medical history.    History reviewed. No pertinent surgical history.    Review of patient's allergies  indicates:  No Known Allergies    No current facility-administered medications on file prior to encounter.     Current Outpatient Medications on File Prior to Encounter   Medication Sig    arginine-vitamin C-vitamin E 4.5 gram-156 mg/9.2 gram PwPk Take 1 packet by mouth 2 (two) times a day.    ascorbic acid, vitamin C, (VITAMIN C) 500 MG tablet Take 500 mg by mouth 2 (two) times daily.    atorvastatin (LIPITOR) 80 MG tablet Take 80 mg by mouth once daily.    CENTRUM SILVER WOMEN 8 mg iron-400 mcg-300 mcg Tab Take 1 tablet by mouth once daily.    diltiaZEM (CARDIZEM) 120 MG tablet Take 120 mg by mouth Daily.    famotidine (PEPCID) 20 MG tablet Take 20 mg by mouth 2 (two) times daily.    ferrous sulfate 325 (65 FE) MG EC tablet Take 325 mg by mouth 2 (two) times daily.    FLUoxetine 10 MG Tab Take 10 mg by mouth once daily.    hydrALAZINE (APRESOLINE) 25 MG tablet Take 25 mg by mouth 3 (three) times daily.    hydrOXYzine HCL (ATARAX) 25 MG tablet Take 25 mg by mouth 4 (four) times daily.    insulin detemir U-100 (LEVEMIR) 100 unit/mL injection Inject 35 Units into the skin 2 (two) times a day.    insulin regular 100 unit/mL Inj injection Inject into the skin 4 (four) times daily. Per sliding scale    loratadine (CLARITIN) 10 mg tablet Take 10 mg by mouth once daily.    metoprolol tartrate (LOPRESSOR) 50 MG tablet Take 50 mg by mouth 2 (two) times daily.    phenazopyridine (PYRIDIUM) 200 MG tablet Take 200 mg by mouth 3 (three) times daily with meals.    polyethylene glycol (GLYCOLAX) 17 gram PwPk Take 17 g by mouth once daily.    traZODone (DESYREL) 50 MG tablet Take 50 mg by mouth every evening.     Family History    None       Tobacco Use    Smoking status: Former    Smokeless tobacco: Never   Substance and Sexual Activity    Alcohol use: Never    Drug use: Never    Sexual activity: Not on file       Review of Systems   Constitutional:  Positive for fatigue.   HENT: Negative.     Respiratory: Negative.      Cardiovascular: Negative.    Gastrointestinal: Negative.    Genitourinary: Negative.    Neurological:  Positive for weakness.   Psychiatric/Behavioral: Negative.         Objective:     Vital Signs (Most Recent):  Temp: 97.4 °F (36.3 °C) (01/26/24 0701)  Pulse: 72 (01/26/24 0701)  Resp: 20 (01/26/24 0701)  BP: (!) 125/51 (01/26/24 0701)  SpO2: 98 % (01/26/24 0701) Vital Signs (24h Range):  Temp:  [97.4 °F (36.3 °C)-98.2 °F (36.8 °C)] 97.4 °F (36.3 °C)  Pulse:  [] 72  Resp:  [18-20] 20  SpO2:  [95 %-100 %] 98 %  BP: ()/(51-82) 125/51     Weight: 71.9 kg (158 lb 8.2 oz)  Body mass index is 23.41 kg/m².    SpO2: 98 %         Intake/Output Summary (Last 24 hours) at 1/26/2024 1259  Last data filed at 1/26/2024 0633  Gross per 24 hour   Intake 855.55 ml   Output 1550 ml   Net -694.45 ml       Lines/Drains/Airways       Central Venous Catheter Line  Duration                  Hemodialysis Catheter right subclavian -- days              Drain  Duration             Female External Urinary Catheter w/ Suction 01/24/24 0830 2 days              Peripheral Intravenous Line  Duration                  Peripheral IV - Single Lumen 01/23/24 1616 18 G Left Antecubital 2 days         Peripheral IV - Single Lumen 01/26/24 1018 22 G Left;Posterior <1 day                    Significant Labs:  Recent Results (from the past 72 hour(s))   Comprehensive Metabolic Panel    Collection Time: 01/23/24  3:05 PM   Result Value Ref Range    Sodium Level 134 (L) 135 - 145 mmol/L    Potassium Level 3.1 (L) 3.5 - 5.1 mmol/L    Chloride 99 98 - 110 mmol/L    Carbon Dioxide 24 21 - 32 mmol/L    Glucose Level 94 70 - 115 mg/dL    Blood Urea Nitrogen 28.0 (H) 7.0 - 20.0 mg/dL    Creatinine 2.05 (H) 0.66 - 1.25 mg/dL    Calcium Level Total 7.4 (L) 8.4 - 10.2 mg/dL    Protein Total 5.9 (L) 6.3 - 8.2 gm/dL    Albumin Level 2.8 (L) 3.4 - 5.0 g/dL    Globulin 3.1 2.0 - 3.9 gm/dL    Albumin/Globulin Ratio 0.9 ratio    Bilirubin Total 0.4 0.0 - 1.0  mg/dL    Alkaline Phosphatase 136 50 - 144 unit/L    Alanine Aminotransferase 40 1 - 45 unit/L    Aspartate Aminotransferase 48 (H) 14 - 36 unit/L    eGFR 26 mls/min/1.73/m2    Anion Gap 11.0 2.0 - 13.0 mEq/L    BUN/Creatinine Ratio 14 12 - 20   Troponin I    Collection Time: 01/23/24  3:05 PM   Result Value Ref Range    Troponin-I <0.012 0.000 - 0.034 ng/mL   CK    Collection Time: 01/23/24  3:05 PM   Result Value Ref Range    Creatine Kinase 25 (L) 30 - 135 U/L   CK-MB    Collection Time: 01/23/24  3:05 PM   Result Value Ref Range    Creatine Kinase MB 2.02 0.0 - 3.38 ng/mL   Magnesium    Collection Time: 01/23/24  3:05 PM   Result Value Ref Range    Magnesium Level 1.40 (L) 1.80 - 2.40 mg/dL   CBC with Differential    Collection Time: 01/23/24  3:05 PM   Result Value Ref Range    WBC 14.28 (H) 4.00 - 11.50 x10(3)/mcL    RBC 2.10 (L) 4.00 - 5.10 x10(6)/mcL    Hgb 6.6 (L) 11.8 - 16.0 g/dL    Hct 19.5 (LL) 36.0 - 48.0 %    MCV 92.9 79.0 - 99.0 fL    MCH 31.4 27.0 - 34.0 pg    MCHC 33.8 31.0 - 37.0 g/dL    RDW 16.6 (H) 11.0 - 14.5 %    Platelet 177 140 - 371 x10(3)/mcL    MPV 8.5 (L) 9.4 - 12.4 fL    Neut % 87.3 (H) 37 - 73 %    Lymph % 6.2 (L) 20 - 55 %    Mono % 4.8 4.7 - 12.5 %    Eos % 0.9 0.7 - 7 %    Basophil % 0.2 0.1 - 1.2 %    Lymph # 0.89 (L) 1.16 - 3.74 x10(3)/mcL    Neut # 12.45 (H) 1.56 - 6.13 x10(3)/mcL    Mono # 0.69 (H) 0.24 - 0.36 x10(3)/mcL    Eos # 0.13 0.04 - 0.36 x10(3)/mcL    Baso # 0.03 0.01 - 0.08 x10(3)/mcL    IG# 0.09 (H) 0.0001 - 0.031 x10(3)/mcL    IG% 0.6 (H) 0 - 0.5 %    NRBC% 0.0 <=1 %   Protime-INR    Collection Time: 01/23/24  3:05 PM   Result Value Ref Range    PT 14.0 (H) 9.3 - 11.9 seconds    INR 1.4 <5.0   Iron Panel    Collection Time: 01/23/24  3:05 PM   Result Value Ref Range    Iron Binding Capacity Unsaturated 63 (L) 70 - 310 ug/dL    Iron Level 117 50 - 170 ug/dL    Transferrin 114 (L) 173 - 360 mg/dL    Iron Binding Capacity Total 180 (L) 250 - 450 ug/dL    Iron Saturation  65 (H) 20 - 50 %   Prepare RBC 2 Units; anemia    Collection Time: 01/23/24  3:30 PM   Result Value Ref Range    UNIT NUMBER [id number]     UNIT ABO/RH A NEG     DISPENSE STATUS Transfused     Unit Expiration [id number]     Product Code M6802I58     Unit Blood Type Code 0600     CROSSMATCH INTERPRETATION Compatible     UNIT NUMBER [id number]     UNIT ABO/RH A NEG     DISPENSE STATUS Transfused     Unit Expiration [id number]     Product Code J3964J00     Unit Blood Type Code 0600     CROSSMATCH INTERPRETATION Compatible    Type & Screen    Collection Time: 01/23/24  3:30 PM   Result Value Ref Range    Specimen Outdate 01/26/2024 23:59     ABO and RH A POS     Antibody Screen NEG    ABORH RETYPE    Collection Time: 01/23/24  3:44 PM   Result Value Ref Range    ABORH Retype A POS    APTT    Collection Time: 01/23/24  7:16 PM   Result Value Ref Range    PTT 36.2 (H) 23.0 - 29.4 seconds   POCT glucose    Collection Time: 01/23/24 10:26 PM   Result Value Ref Range    POCT Glucose 219 (H) 70 - 110 mg/dL   Hemoglobin and Hematocrit    Collection Time: 01/23/24 11:10 PM   Result Value Ref Range    Hgb 8.9 (L) 11.8 - 16.0 g/dL    Hct 26.0 (L) 36.0 - 48.0 %   Basic metabolic panel    Collection Time: 01/24/24  3:42 AM   Result Value Ref Range    Sodium Level 134 (L) 135 - 145 mmol/L    Potassium Level 3.7 3.5 - 5.1 mmol/L    Chloride 99 98 - 110 mmol/L    Carbon Dioxide 29 21 - 32 mmol/L    Glucose Level 147 (H) 70 - 115 mg/dL    Blood Urea Nitrogen 37.0 (H) 7.0 - 20.0 mg/dL    Creatinine 2.69 (H) 0.66 - 1.25 mg/dL    BUN/Creatinine Ratio 14 12 - 20    Calcium Level Total 7.9 (L) 8.4 - 10.2 mg/dL    Anion Gap 6.0 2.0 - 13.0 mEq/L    eGFR 19 mls/min/1.73/m2   CBC with Differential    Collection Time: 01/24/24  3:42 AM   Result Value Ref Range    WBC 8.46 4.00 - 11.50 x10(3)/mcL    RBC 2.89 (L) 4.00 - 5.10 x10(6)/mcL    Hgb 8.9 (L) 11.8 - 16.0 g/dL    Hct 26.2 (L) 36.0 - 48.0 %    MCV 90.7 79.0 - 99.0 fL    MCH 30.8  27.0 - 34.0 pg    MCHC 34.0 31.0 - 37.0 g/dL    RDW 16.3 (H) 11.0 - 14.5 %    Platelet 150 140 - 371 x10(3)/mcL    MPV 8.9 (L) 9.4 - 12.4 fL    Neut % 63.5 37 - 73 %    Lymph % 22.1 20 - 55 %    Mono % 12.2 4.7 - 12.5 %    Eos % 1.2 0.7 - 7 %    Basophil % 0.5 0.1 - 1.2 %    Lymph # 1.87 1.16 - 3.74 x10(3)/mcL    Neut # 5.38 1.56 - 6.13 x10(3)/mcL    Mono # 1.03 (H) 0.24 - 0.36 x10(3)/mcL    Eos # 0.10 0.04 - 0.36 x10(3)/mcL    Baso # 0.04 0.01 - 0.08 x10(3)/mcL    IG# 0.04 (H) 0.0001 - 0.031 x10(3)/mcL    IG% 0.5 0 - 0.5 %    NRBC% 0.0 <=1 %   POCT glucose    Collection Time: 01/24/24 11:56 AM   Result Value Ref Range    POCT Glucose 208 (H) 70 - 110 mg/dL   POCT glucose    Collection Time: 01/24/24  5:38 PM   Result Value Ref Range    POCT Glucose 156 (H) 70 - 110 mg/dL   POCT glucose    Collection Time: 01/24/24  8:36 PM   Result Value Ref Range    POCT Glucose 174 (H) 70 - 110 mg/dL   CBC with Differential    Collection Time: 01/25/24  3:53 AM   Result Value Ref Range    WBC 7.60 4.00 - 11.50 x10(3)/mcL    RBC 3.00 (L) 4.00 - 5.10 x10(6)/mcL    Hgb 9.3 (L) 11.8 - 16.0 g/dL    Hct 27.7 (L) 36.0 - 48.0 %    MCV 92.3 79.0 - 99.0 fL    MCH 31.0 27.0 - 34.0 pg    MCHC 33.6 31.0 - 37.0 g/dL    RDW 16.5 (H) 11.0 - 14.5 %    Platelet 169 140 - 371 x10(3)/mcL    MPV 8.9 (L) 9.4 - 12.4 fL    Neut % 57.2 37 - 73 %    Lymph % 25.7 20 - 55 %    Mono % 10.4 4.7 - 12.5 %    Eos % 5.3 0.7 - 7 %    Basophil % 0.7 0.1 - 1.2 %    Lymph # 1.95 1.16 - 3.74 x10(3)/mcL    Neut # 4.36 1.56 - 6.13 x10(3)/mcL    Mono # 0.79 (H) 0.24 - 0.36 x10(3)/mcL    Eos # 0.40 (H) 0.04 - 0.36 x10(3)/mcL    Baso # 0.05 0.01 - 0.08 x10(3)/mcL    IG# 0.05 (H) 0.0001 - 0.031 x10(3)/mcL    IG% 0.7 (H) 0 - 0.5 %    NRBC% 0.0 <=1 %   Basic metabolic panel    Collection Time: 01/25/24  3:54 AM   Result Value Ref Range    Sodium Level 135 135 - 145 mmol/L    Potassium Level 3.6 3.5 - 5.1 mmol/L    Chloride 100 98 - 110 mmol/L    Carbon Dioxide 30 21 - 32  mmol/L    Glucose Level 92 70 - 115 mg/dL    Blood Urea Nitrogen 42.0 (H) 7.0 - 20.0 mg/dL    Creatinine 3.52 (H) 0.66 - 1.25 mg/dL    BUN/Creatinine Ratio 12 12 - 20    Calcium Level Total 8.5 8.4 - 10.2 mg/dL    Anion Gap 5.0 2.0 - 13.0 mEq/L    eGFR 14 mls/min/1.73/m2   POCT glucose    Collection Time: 01/25/24 11:53 AM   Result Value Ref Range    POCT Glucose 144 (H) 70 - 110 mg/dL   Urinalysis, Reflex to Urine Culture    Collection Time: 01/25/24  1:14 PM    Specimen: Urine, Clean Catch   Result Value Ref Range    Color, UA Yellow Yellow, Light-Yellow, Dark Yellow, Brenda, Straw    Appearance, UA Clear Clear    Specific Gravity, UA 1.010 1.005 - 1.030    pH, UA 7.5 5.0 - 8.5    Protein, UA 30 (A) Negative    Glucose, UA Negative Negative, Normal    Ketones, UA Negative Negative    Blood, UA Moderate (A) Negative    Bilirubin, UA Negative Negative    Urobilinogen, UA 0.2 0.2, 1.0, Normal    Nitrites, UA Negative Negative    Leukocyte Esterase, UA Large (A) Negative   Urinalysis, Microscopic    Collection Time: 01/25/24  1:14 PM   Result Value Ref Range    Bacteria, UA Many (A) None Seen, Rare, Occasional /HPF    RBC, UA 0-5 None Seen, 0-2, 3-5, 0-5 /HPF    WBC, UA 50-99 (A) None Seen, 0-2, 3-5, 0-5 /HPF    Squamous Epithelial Cells, UA Occasional None Seen, Rare, Occasional, Occ /HPF   Urine culture    Collection Time: 01/25/24  1:14 PM    Specimen: Urine, Clean Catch   Result Value Ref Range    Urine Culture >/= 100,000 colonies/ml Gram-negative Rods (A)    POCT glucose    Collection Time: 01/25/24  4:03 PM   Result Value Ref Range    POCT Glucose 250 (H) 70 - 110 mg/dL   Magnesium    Collection Time: 01/25/24  8:19 PM   Result Value Ref Range    Magnesium Level 1.90 1.80 - 2.40 mg/dL   Troponin I    Collection Time: 01/25/24  8:19 PM   Result Value Ref Range    Troponin-I 5.400 (HH) 0.000 - 0.034 ng/mL   CK    Collection Time: 01/25/24  8:19 PM   Result Value Ref Range    Creatine Kinase 82 30 - 135 U/L   POCT  glucose    Collection Time: 01/25/24  8:55 PM   Result Value Ref Range    POCT Glucose 221 (H) 70 - 110 mg/dL   APTT    Collection Time: 01/25/24  9:11 PM   Result Value Ref Range    PTT 27.9 23.0 - 29.4 seconds   Protime-INR    Collection Time: 01/25/24  9:11 PM   Result Value Ref Range    PT 11.0 9.3 - 11.9 seconds    INR 1.1 <5.0   CBC with Differential    Collection Time: 01/25/24  9:11 PM   Result Value Ref Range    WBC 8.43 4.00 - 11.50 x10(3)/mcL    RBC 3.33 (L) 4.00 - 5.10 x10(6)/mcL    Hgb 10.4 (L) 11.8 - 16.0 g/dL    Hct 31.1 (L) 36.0 - 48.0 %    MCV 93.4 79.0 - 99.0 fL    MCH 31.2 27.0 - 34.0 pg    MCHC 33.4 31.0 - 37.0 g/dL    RDW 16.1 (H) 11.0 - 14.5 %    Platelet 206 140 - 371 x10(3)/mcL    MPV 8.3 (L) 9.4 - 12.4 fL    Neut % 68.1 37 - 73 %    Lymph % 15.7 (L) 20 - 55 %    Mono % 10.2 4.7 - 12.5 %    Eos % 4.9 0.7 - 7 %    Basophil % 0.7 0.1 - 1.2 %    Lymph # 1.32 1.16 - 3.74 x10(3)/mcL    Neut # 5.75 1.56 - 6.13 x10(3)/mcL    Mono # 0.86 (H) 0.24 - 0.36 x10(3)/mcL    Eos # 0.41 (H) 0.04 - 0.36 x10(3)/mcL    Baso # 0.06 0.01 - 0.08 x10(3)/mcL    IG# 0.03 0.0001 - 0.031 x10(3)/mcL    IG% 0.4 0 - 0.5 %    NRBC% 0.0 <=1 %   POCT glucose    Collection Time: 01/25/24 10:30 PM   Result Value Ref Range    POCT Glucose 185 (H) 70 - 110 mg/dL   Basic metabolic panel    Collection Time: 01/26/24  4:40 AM   Result Value Ref Range    Sodium Level 134 (L) 135 - 145 mmol/L    Potassium Level 3.7 3.5 - 5.1 mmol/L    Chloride 101 98 - 110 mmol/L    Carbon Dioxide 24 21 - 32 mmol/L    Glucose Level 95 70 - 115 mg/dL    Blood Urea Nitrogen 44.0 (H) 7.0 - 20.0 mg/dL    Creatinine 3.90 (H) 0.66 - 1.25 mg/dL    BUN/Creatinine Ratio 11 (L) 12 - 20    Calcium Level Total 8.5 8.4 - 10.2 mg/dL    Anion Gap 9.0 2.0 - 13.0 mEq/L    eGFR 12 mls/min/1.73/m2   Magnesium    Collection Time: 01/26/24  4:40 AM   Result Value Ref Range    Magnesium Level 2.00 1.80 - 2.40 mg/dL   PTT Heparin Monitoring    Collection Time: 01/26/24   4:40 AM   Result Value Ref Range    PTT Heparin Monitor >121.0 (HH) <=80.0 seconds   CBC with Differential    Collection Time: 01/26/24  4:40 AM   Result Value Ref Range    WBC 8.21 4.00 - 11.50 x10(3)/mcL    RBC 3.11 (L) 4.00 - 5.10 x10(6)/mcL    Hgb 9.3 (L) 11.8 - 16.0 g/dL    Hct 28.8 (L) 36.0 - 48.0 %    MCV 92.6 79.0 - 99.0 fL    MCH 29.9 27.0 - 34.0 pg    MCHC 32.3 31.0 - 37.0 g/dL    RDW 15.8 (H) 11.0 - 14.5 %    Platelet 175 140 - 371 x10(3)/mcL    MPV 8.3 (L) 9.4 - 12.4 fL    Neut % 48.2 37 - 73 %    Lymph % 30.7 20 - 55 %    Mono % 10.0 4.7 - 12.5 %    Eos % 9.5 (H) 0.7 - 7 %    Basophil % 0.9 0.1 - 1.2 %    Lymph # 2.52 1.16 - 3.74 x10(3)/mcL    Neut # 3.96 1.56 - 6.13 x10(3)/mcL    Mono # 0.82 (H) 0.24 - 0.36 x10(3)/mcL    Eos # 0.78 (H) 0.04 - 0.36 x10(3)/mcL    Baso # 0.07 0.01 - 0.08 x10(3)/mcL    IG# 0.06 (H) 0.0001 - 0.031 x10(3)/mcL    IG% 0.7 (H) 0 - 0.5 %    NRBC% 0.0 <=1 %   Troponin I    Collection Time: 01/26/24  4:40 AM   Result Value Ref Range    Troponin-I 5.280 (HH) 0.000 - 0.034 ng/mL   POCT glucose    Collection Time: 01/26/24  5:26 AM   Result Value Ref Range    POCT Glucose 80 70 - 110 mg/dL   POCT glucose    Collection Time: 01/26/24  8:08 AM   Result Value Ref Range    POCT Glucose 91 70 - 110 mg/dL   POCT glucose    Collection Time: 01/26/24 11:44 AM   Result Value Ref Range    POCT Glucose 172 (H) 70 - 110 mg/dL       Significant Imaging:  Imaging Results    None         EKG:                Telemetry:  SR [id number]    Physical Exam  Constitutional:       Appearance: She is obese. She is ill-appearing.   HENT:      Mouth/Throat:      Mouth: Mucous membranes are moist.   Eyes:      Extraocular Movements: Extraocular movements intact.   Cardiovascular:      Rate and Rhythm: Normal rate and regular rhythm.   Pulmonary:      Effort: Pulmonary effort is normal.   Abdominal:      Palpations: Abdomen is soft.   Skin:     General: Skin is warm.       Home Medications:   No current  facility-administered medications on file prior to encounter.     Current Outpatient Medications on File Prior to Encounter   Medication Sig Dispense Refill    arginine-vitamin C-vitamin E 4.5 gram-156 mg/9.2 gram PwPk Take 1 packet by mouth 2 (two) times a day.      ascorbic acid, vitamin C, (VITAMIN C) 500 MG tablet Take 500 mg by mouth 2 (two) times daily.      atorvastatin (LIPITOR) 80 MG tablet Take 80 mg by mouth once daily.      CENTRUM SILVER WOMEN 8 mg iron-400 mcg-300 mcg Tab Take 1 tablet by mouth once daily.      diltiaZEM (CARDIZEM) 120 MG tablet Take 120 mg by mouth Daily.      famotidine (PEPCID) 20 MG tablet Take 20 mg by mouth 2 (two) times daily.      ferrous sulfate 325 (65 FE) MG EC tablet Take 325 mg by mouth 2 (two) times daily.      FLUoxetine 10 MG Tab Take 10 mg by mouth once daily.      hydrALAZINE (APRESOLINE) 25 MG tablet Take 25 mg by mouth 3 (three) times daily.      hydrOXYzine HCL (ATARAX) 25 MG tablet Take 25 mg by mouth 4 (four) times daily.      insulin detemir U-100 (LEVEMIR) 100 unit/mL injection Inject 35 Units into the skin 2 (two) times a day.      insulin regular 100 unit/mL Inj injection Inject into the skin 4 (four) times daily. Per sliding scale      loratadine (CLARITIN) 10 mg tablet Take 10 mg by mouth once daily.      metoprolol tartrate (LOPRESSOR) 50 MG tablet Take 50 mg by mouth 2 (two) times daily.      phenazopyridine (PYRIDIUM) 200 MG tablet Take 200 mg by mouth 3 (three) times daily with meals.      polyethylene glycol (GLYCOLAX) 17 gram PwPk Take 17 g by mouth once daily.      traZODone (DESYREL) 50 MG tablet Take 50 mg by mouth every evening.         Current Inpatient Medications:    Current Facility-Administered Medications:     0.9%  NaCl infusion (for blood administration), , Intravenous, Q24H PRN, Tony Larose MD    acetaminophen tablet 650 mg, 650 mg, Oral, Q4H PRN, Melly Ruiz MD, 650 mg at 01/24/24 1934    aspirin EC tablet 325 mg, 325 mg, Oral,  Daily, Micah Cruz MD, 325 mg at 01/26/24 0806    atorvastatin tablet 40 mg, 40 mg, Oral, Daily, Micah Cruz MD, 40 mg at 01/26/24 0806    cefTRIAXone (Rocephin) 1 g in dextrose 5 % in water (D5W) 100 mL IVPB (MB+), 1 g, Intravenous, Q24H, Cruz Jacobs MD, Stopped at 01/25/24 1603    dextrose 10% bolus 125 mL 125 mL, 12.5 g, Intravenous, PRN, Latrell Beltran,     dextrose 10% bolus 250 mL 250 mL, 25 g, Intravenous, PRN, Latrell Beltran,     famotidine (PF) injection 20 mg, 20 mg, Intravenous, Daily, Latrell Beltran DO, 20 mg at 01/26/24 0806    FLUoxetine capsule 10 mg, 10 mg, Oral, Daily, Latrell Beltran DO, 10 mg at 01/26/24 0806    glucagon (human recombinant) injection 1 mg, 1 mg, Intramuscular, PRN, Latrell Beltarn DO    glucose chewable tablet 16 g, 16 g, Oral, PRN, Latrell Beltran,     glucose chewable tablet 24 g, 24 g, Oral, PRN, Latrell Beltran,     heparin 25,000 units in dextrose 5% (100 units/ml) IV bolus from bag - ADDITIONAL PRN BOLUS - 30 units/kg (max bolus 4000 units), 30 Units/kg (Adjusted), Intravenous, PRN, Micah Cruz MD    heparin 25,000 units in dextrose 5% (100 units/ml) IV bolus from bag - ADDITIONAL PRN BOLUS - 60 units/kg (max bolus 4000 units), 58.4 Units/kg (Adjusted), Intravenous, PRN, Micah Cruz MD    heparin 25,000 units in dextrose 5% 250 mL (100 units/mL) infusion LOW INTENSITY nomogram - [facility], 0-40 Units/kg/hr (Adjusted), Intravenous, Continuous, Micah Cruz MD, Last Rate: 25.3 mL/hr at 01/26/24 0736, 37 Units/kg/hr at 01/26/24 0736    insulin detemir U-100 (Levemir) pen 20 Units, 20 Units, Subcutaneous, QHS, Latrell Beltran DO, 20 Units at 01/25/24 2057    magnesium oxide tablet 800 mg, 800 mg, Oral, PRN, Latrell Beltran DO    magnesium oxide tablet 800 mg, 800 mg, Oral, PRN, Latrell Beltran DO    metoprolol injection 2.5 mg, 2.5 mg, Intravenous, Q6H PRN, Cruz Jacobs MD    miconazole NITRATE 2 %  top powder, , Topical (Top), BID, Cruz Jacobs MD, Given at 01/26/24 0806    mupirocin 2 % ointment, , Nasal, BID, Melly Ruiz MD, Given at 01/26/24 0806    ondansetron injection 4 mg, 4 mg, Intravenous, Q8H PRN, Latrell Beltran,     potassium bicarbonate disintegrating tablet 35 mEq, 35 mEq, Oral, PRN, Latrell Beltran,     potassium bicarbonate disintegrating tablet 50 mEq, 50 mEq, Oral, PRN, Latrell Beltran,     potassium bicarbonate disintegrating tablet 60 mEq, 60 mEq, Oral, PRN, Latrell Beltran,     potassium chloride SA CR tablet 20 mEq, 20 mEq, Oral, Daily, Cruz Jacobs MD, 20 mEq at 01/26/24 0806    potassium, sodium phosphates 280-160-250 mg packet 2 packet, 2 packet, Oral, PRN, Latrell Beltran,     potassium, sodium phosphates 280-160-250 mg packet 2 packet, 2 packet, Oral, PRN, Latrell Beltran,     potassium, sodium phosphates 280-160-250 mg packet 2 packet, 2 packet, Oral, PRN, Latrell Beltran,     prochlorperazine injection Soln 5 mg, 5 mg, Intravenous, Q6H PRN, Latrell Beltran DO    Questran and Aquaphor Topical Compound, , Topical (Top), BID, Cruz Jacobs MD, Given at 01/26/24 0806    sodium chloride 0.9% flush 10 mL, 10 mL, Intravenous, PRN, Latrell Beltran DO    traZODone tablet 50 mg, 50 mg, Oral, QHS, Latrell Beltran, , 50 mg at 01/25/24 2110         VTE Risk Mitigation (From admission, onward)           Ordered     heparin 25,000 units in dextrose 5% (100 units/ml) IV bolus from bag - ADDITIONAL PRN BOLUS - 60 units/kg (max bolus 4000 units)  As needed (PRN)        Question:  Heparin Infusion Adjustment (DO NOT MODIFY ANSWER)  Answer:  [email or web address]\Images\Pharmacy\HeparinInfusions\heparin LOW INTENSITY nomogram for [facility] [id number]    01/25/24 2053     heparin 25,000 units in dextrose 5% (100 units/ml) IV bolus from bag - ADDITIONAL PRN BOLUS - 30 units/kg (max bolus 4000 units)  As needed (PRN)        Question:  Heparin Infusion Adjustment  (DO NOT MODIFY ANSWER)  Answer:  [email or web address]\Images\Pharmacy\HeparinInfusions\heparin LOW INTENSITY nomogram for [facility] [email or web address]    01/25/24 2053     heparin 25,000 units in dextrose 5% 250 mL (100 units/mL) infusion LOW INTENSITY nomogram - [facility]  Continuous        Question:  Begin at (units/kg/hr)  Answer:  12    01/25/24 2053     IP VTE LOW RISK PATIENT  Once         01/23/24 2136     Place sequential compression device  Until discontinued         01/23/24 2136                    Assessment:   NSTEMI with markedly abnormal EKG on admit    on heparin gtt    Tn 5 - currently asymptomatic with improved EKG  Paroxysmal Afib with RVR with c/o CP - now in SR    OAC discontinued due to anemia  CHB - resolved  Anemia requiring transfusion  ESRD on HD - nephrology was consulted          Plan:   Discussed with Dr Berrios  Recommend transfer  Echocardiogram  Continue Heparin gtt  Monitor H/H  Avoid AVN blocking agents  Continue ASA and statin  Plavix 600 mg x 1  Then Plavix 75 mg daily starting tomorrow  No DOAC in lieu of anemia requiring transfusion        Thank you for your consult.     Kim Contreras, JAIRO  Cardiology  [facility]  01/26/2024 12:59 PM

## 2024-01-26 NOTE — NURSING
Dr. Tubbs beamed in to see patient & recommending transfer to [facility];agrees with Heparin drip & aspirin

## 2024-01-26 NOTE — NURSING
Dr. Victor called back;told him about all of the things we were doing for her & that [facility] would be seeing her;no further orders & he will see her tomorrow

## 2024-01-26 NOTE — HPI
Pt is a 68 y.o. female with hx of Paroxysmal Afib, ESRD on HD, DMII, Depression sent to ER after HD today with NV and not feeling well during and after HD.  After HD finished she was noted to be Hypotensive and got fluid back.  On arrival to ER was found to be in Afib with RVR and as well as hypotensive and anemic.  No reports of blood loss that she is aware.       Review of patient's allergies indicates:  No Known Allergies      ESRD on HD, HTN, Depression

## 2024-01-26 NOTE — ASSESSMENT & PLAN NOTE
Creatine stable for now. BMP reviewed- noted Estimated Creatinine Clearance: 14.4 mL/min (A) (based on SCr of 3.9 mg/dL (H)). according to latest data. Based on current GFR, CKD stage is stage 4 - GFR 15-29.  Monitor UOP and serial BMP and adjust therapy as needed. Renally dose meds. Avoid nephrotoxic medications and procedures.

## 2024-01-26 NOTE — NURSING
Spoke with Dr. Cruz about problem with consult for cardiology;new order for telecardiology with cis

## 2024-01-26 NOTE — TELEMEDICINE CONSULT
[facility]    Cardiology  Consult Note    Patient Name: Elvira Caldwell  MRN: [id number]  Admission Date: 1/23/2024  Hospital Length of Stay: 2 days  Code Status: Full Code   Attending Provider: Cruz Jacobs MD   Consulting Provider: Adonay Carrasquillo MD  Primary Care Physician: Chaparrita, Primary Doctor  Principal Problem:Atrial fibrillation with RVR    Patient information was obtained from patient and ER records.     Subjective:     Chief Complaint:  Atrial fibrillation rapid ventricular response, third-degree heart block, elevated troponin of 5     HPI:  Patient is a 68-year-old white female who was admitted to [facility] with atrial fibrillation rapid ventricular response during dialysis.  The patient was treated with a diltiazem drip p.o. diltiazem and Lopressor and converted to sinus rhythm.  The patient was bradycardic and has developed third-degree heart block.  The patient also has an elevated troponin of 5.  EKG shows ST depression.  She is not having any chest pain or shortness of breath.  She stated when she was in atrial fibrillation with rapid ventricular response that she had chest pain.  The patient has no history of coronary artery disease and has had no testing.  The patient is currently hemodynamically stable chest pain-free on a heparin drip.      PMH:  End-stage renal disease on hemodialysis, hyperlipidemia hypertension.  PSH:  Noncontributory  Family History:  Noncontributory  Social History:  Does not smoke    Previous Cardiac Diagnostics:   None performed            Review of patient's allergies indicates:  No Known Allergies    No current facility-administered medications on file prior to encounter.     Current Outpatient Medications on File Prior to Encounter   Medication Sig    arginine-vitamin C-vitamin E 4.5 gram-156 mg/9.2 gram PwPk Take 1 packet by mouth 2 (two) times a day.    ascorbic acid, vitamin C, (VITAMIN C) 500 MG tablet Take 500 mg by mouth 2  (two) times daily.    atorvastatin (LIPITOR) 80 MG tablet Take 80 mg by mouth once daily.    CENTRUM SILVER WOMEN 8 mg iron-400 mcg-300 mcg Tab Take 1 tablet by mouth once daily.    diltiaZEM (CARDIZEM) 120 MG tablet Take 120 mg by mouth Daily.    famotidine (PEPCID) 20 MG tablet Take 20 mg by mouth 2 (two) times daily.    ferrous sulfate 325 (65 FE) MG EC tablet Take 325 mg by mouth 2 (two) times daily.    FLUoxetine 10 MG Tab Take 10 mg by mouth once daily.    hydrALAZINE (APRESOLINE) 25 MG tablet Take 25 mg by mouth 3 (three) times daily.    hydrOXYzine HCL (ATARAX) 25 MG tablet Take 25 mg by mouth 4 (four) times daily.    insulin detemir U-100 (LEVEMIR) 100 unit/mL injection Inject 35 Units into the skin 2 (two) times a day.    insulin regular 100 unit/mL Inj injection Inject into the skin 4 (four) times daily. Per sliding scale    loratadine (CLARITIN) 10 mg tablet Take 10 mg by mouth once daily.    metoprolol tartrate (LOPRESSOR) 50 MG tablet Take 50 mg by mouth 2 (two) times daily.    phenazopyridine (PYRIDIUM) 200 MG tablet Take 200 mg by mouth 3 (three) times daily with meals.    polyethylene glycol (GLYCOLAX) 17 gram PwPk Take 17 g by mouth once daily.    traZODone (DESYREL) 50 MG tablet Take 50 mg by mouth every evening.     Family History    None       Tobacco Use    Smoking status: Former    Smokeless tobacco: Never   Substance and Sexual Activity    Alcohol use: Never    Drug use: Never    Sexual activity: Not on file       Review of Systems   Respiratory:  Positive for shortness of breath.    Cardiovascular:  Positive for chest pain and palpitations.       Objective:     Vital Signs (Most Recent):  Temp: 98.2 °F (36.8 °C) (01/25/24 1943)  Pulse: (!) 59 (01/25/24 2013)  Resp: 18 (01/25/24 2013)  BP: 99/74 (01/25/24 1943)  SpO2: 96 % (01/25/24 2013) Vital Signs (24h Range):  Temp:  [97.1 °F (36.2 °C)-98.2 °F (36.8 °C)] 98.2 °F (36.8 °C)  Pulse:  [] 59  Resp:  [12-19] 18  SpO2:  [95 %-100 %] 96  %  BP: ()/(46-82) 99/74     Weight: 71.9 kg (158 lb 8.2 oz)  Body mass index is 23.41 kg/m².    SpO2: 96 %         Intake/Output Summary (Last 24 hours) at 1/25/2024 2212  Last data filed at 1/25/2024 1816  Gross per 24 hour   Intake 180 ml   Output 1550 ml   Net -1370 ml       Lines/Drains/Airways       Central Venous Catheter Line  Duration                  Hemodialysis Catheter right subclavian -- days              Drain  Duration             Female External Urinary Catheter w/ Suction 01/24/24 0830 1 day              Peripheral Intravenous Line  Duration                  Peripheral IV - Single Lumen 01/23/24 1616 18 G Left Antecubital 2 days                    Significant Labs:  Recent Results (from the past 72 hour(s))   Comprehensive Metabolic Panel    Collection Time: 01/23/24  3:05 PM   Result Value Ref Range    Sodium Level 134 (L) 135 - 145 mmol/L    Potassium Level 3.1 (L) 3.5 - 5.1 mmol/L    Chloride 99 98 - 110 mmol/L    Carbon Dioxide 24 21 - 32 mmol/L    Glucose Level 94 70 - 115 mg/dL    Blood Urea Nitrogen 28.0 (H) 7.0 - 20.0 mg/dL    Creatinine 2.05 (H) 0.66 - 1.25 mg/dL    Calcium Level Total 7.4 (L) 8.4 - 10.2 mg/dL    Protein Total 5.9 (L) 6.3 - 8.2 gm/dL    Albumin Level 2.8 (L) 3.4 - 5.0 g/dL    Globulin 3.1 2.0 - 3.9 gm/dL    Albumin/Globulin Ratio 0.9 ratio    Bilirubin Total 0.4 0.0 - 1.0 mg/dL    Alkaline Phosphatase 136 50 - 144 unit/L    Alanine Aminotransferase 40 1 - 45 unit/L    Aspartate Aminotransferase 48 (H) 14 - 36 unit/L    eGFR 26 mls/min/1.73/m2    Anion Gap 11.0 2.0 - 13.0 mEq/L    BUN/Creatinine Ratio 14 12 - 20   Troponin I    Collection Time: 01/23/24  3:05 PM   Result Value Ref Range    Troponin-I <0.012 0.000 - 0.034 ng/mL   CK    Collection Time: 01/23/24  3:05 PM   Result Value Ref Range    Creatine Kinase 25 (L) 30 - 135 U/L   CK-MB    Collection Time: 01/23/24  3:05 PM   Result Value Ref Range    Creatine Kinase MB 2.02 0.0 - 3.38 ng/mL   Magnesium     Collection Time: 01/23/24  3:05 PM   Result Value Ref Range    Magnesium Level 1.40 (L) 1.80 - 2.40 mg/dL   CBC with Differential    Collection Time: 01/23/24  3:05 PM   Result Value Ref Range    WBC 14.28 (H) 4.00 - 11.50 x10(3)/mcL    RBC 2.10 (L) 4.00 - 5.10 x10(6)/mcL    Hgb 6.6 (L) 11.8 - 16.0 g/dL    Hct 19.5 (LL) 36.0 - 48.0 %    MCV 92.9 79.0 - 99.0 fL    MCH 31.4 27.0 - 34.0 pg    MCHC 33.8 31.0 - 37.0 g/dL    RDW 16.6 (H) 11.0 - 14.5 %    Platelet 177 140 - 371 x10(3)/mcL    MPV 8.5 (L) 9.4 - 12.4 fL    Neut % 87.3 (H) 37 - 73 %    Lymph % 6.2 (L) 20 - 55 %    Mono % 4.8 4.7 - 12.5 %    Eos % 0.9 0.7 - 7 %    Basophil % 0.2 0.1 - 1.2 %    Lymph # 0.89 (L) 1.16 - 3.74 x10(3)/mcL    Neut # 12.45 (H) 1.56 - 6.13 x10(3)/mcL    Mono # 0.69 (H) 0.24 - 0.36 x10(3)/mcL    Eos # 0.13 0.04 - 0.36 x10(3)/mcL    Baso # 0.03 0.01 - 0.08 x10(3)/mcL    IG# 0.09 (H) 0.0001 - 0.031 x10(3)/mcL    IG% 0.6 (H) 0 - 0.5 %    NRBC% 0.0 <=1 %   Protime-INR    Collection Time: 01/23/24  3:05 PM   Result Value Ref Range    PT 14.0 (H) 9.3 - 11.9 seconds    INR 1.4 <5.0   Iron Panel    Collection Time: 01/23/24  3:05 PM   Result Value Ref Range    Iron Binding Capacity Unsaturated 63 (L) 70 - 310 ug/dL    Iron Level 117 50 - 170 ug/dL    Transferrin 114 (L) 173 - 360 mg/dL    Iron Binding Capacity Total 180 (L) 250 - 450 ug/dL    Iron Saturation 65 (H) 20 - 50 %   Prepare RBC 2 Units; anemia    Collection Time: 01/23/24  3:30 PM   Result Value Ref Range    UNIT NUMBER [id number]     UNIT ABO/RH A NEG     DISPENSE STATUS Transfused     Unit Expiration [id number]     Product Code N9005G44     Unit Blood Type Code 0600     CROSSMATCH INTERPRETATION Compatible     UNIT NUMBER [id number]     UNIT ABO/RH A NEG     DISPENSE STATUS Transfused     Unit Expiration [id number]     Product Code W8009O30     Unit Blood Type Code 0600     CROSSMATCH INTERPRETATION Compatible    Type & Screen    Collection Time: 01/23/24  3:30 PM   Result  Value Ref Range    Specimen Outdate 01/26/2024 23:59     ABO and RH A POS     Antibody Screen NEG    ABORH RETYPE    Collection Time: 01/23/24  3:44 PM   Result Value Ref Range    ABORH Retype A POS    APTT    Collection Time: 01/23/24  7:16 PM   Result Value Ref Range    PTT 36.2 (H) 23.0 - 29.4 seconds   POCT glucose    Collection Time: 01/23/24 10:26 PM   Result Value Ref Range    POCT Glucose 219 (H) 70 - 110 mg/dL   Hemoglobin and Hematocrit    Collection Time: 01/23/24 11:10 PM   Result Value Ref Range    Hgb 8.9 (L) 11.8 - 16.0 g/dL    Hct 26.0 (L) 36.0 - 48.0 %   Basic metabolic panel    Collection Time: 01/24/24  3:42 AM   Result Value Ref Range    Sodium Level 134 (L) 135 - 145 mmol/L    Potassium Level 3.7 3.5 - 5.1 mmol/L    Chloride 99 98 - 110 mmol/L    Carbon Dioxide 29 21 - 32 mmol/L    Glucose Level 147 (H) 70 - 115 mg/dL    Blood Urea Nitrogen 37.0 (H) 7.0 - 20.0 mg/dL    Creatinine 2.69 (H) 0.66 - 1.25 mg/dL    BUN/Creatinine Ratio 14 12 - 20    Calcium Level Total 7.9 (L) 8.4 - 10.2 mg/dL    Anion Gap 6.0 2.0 - 13.0 mEq/L    eGFR 19 mls/min/1.73/m2   CBC with Differential    Collection Time: 01/24/24  3:42 AM   Result Value Ref Range    WBC 8.46 4.00 - 11.50 x10(3)/mcL    RBC 2.89 (L) 4.00 - 5.10 x10(6)/mcL    Hgb 8.9 (L) 11.8 - 16.0 g/dL    Hct 26.2 (L) 36.0 - 48.0 %    MCV 90.7 79.0 - 99.0 fL    MCH 30.8 27.0 - 34.0 pg    MCHC 34.0 31.0 - 37.0 g/dL    RDW 16.3 (H) 11.0 - 14.5 %    Platelet 150 140 - 371 x10(3)/mcL    MPV 8.9 (L) 9.4 - 12.4 fL    Neut % 63.5 37 - 73 %    Lymph % 22.1 20 - 55 %    Mono % 12.2 4.7 - 12.5 %    Eos % 1.2 0.7 - 7 %    Basophil % 0.5 0.1 - 1.2 %    Lymph # 1.87 1.16 - 3.74 x10(3)/mcL    Neut # 5.38 1.56 - 6.13 x10(3)/mcL    Mono # 1.03 (H) 0.24 - 0.36 x10(3)/mcL    Eos # 0.10 0.04 - 0.36 x10(3)/mcL    Baso # 0.04 0.01 - 0.08 x10(3)/mcL    IG# 0.04 (H) 0.0001 - 0.031 x10(3)/mcL    IG% 0.5 0 - 0.5 %    NRBC% 0.0 <=1 %   POCT glucose    Collection Time: 01/24/24 11:56 AM    Result Value Ref Range    POCT Glucose 208 (H) 70 - 110 mg/dL   POCT glucose    Collection Time: 01/24/24  5:38 PM   Result Value Ref Range    POCT Glucose 156 (H) 70 - 110 mg/dL   POCT glucose    Collection Time: 01/24/24  8:36 PM   Result Value Ref Range    POCT Glucose 174 (H) 70 - 110 mg/dL   CBC with Differential    Collection Time: 01/25/24  3:53 AM   Result Value Ref Range    WBC 7.60 4.00 - 11.50 x10(3)/mcL    RBC 3.00 (L) 4.00 - 5.10 x10(6)/mcL    Hgb 9.3 (L) 11.8 - 16.0 g/dL    Hct 27.7 (L) 36.0 - 48.0 %    MCV 92.3 79.0 - 99.0 fL    MCH 31.0 27.0 - 34.0 pg    MCHC 33.6 31.0 - 37.0 g/dL    RDW 16.5 (H) 11.0 - 14.5 %    Platelet 169 140 - 371 x10(3)/mcL    MPV 8.9 (L) 9.4 - 12.4 fL    Neut % 57.2 37 - 73 %    Lymph % 25.7 20 - 55 %    Mono % 10.4 4.7 - 12.5 %    Eos % 5.3 0.7 - 7 %    Basophil % 0.7 0.1 - 1.2 %    Lymph # 1.95 1.16 - 3.74 x10(3)/mcL    Neut # 4.36 1.56 - 6.13 x10(3)/mcL    Mono # 0.79 (H) 0.24 - 0.36 x10(3)/mcL    Eos # 0.40 (H) 0.04 - 0.36 x10(3)/mcL    Baso # 0.05 0.01 - 0.08 x10(3)/mcL    IG# 0.05 (H) 0.0001 - 0.031 x10(3)/mcL    IG% 0.7 (H) 0 - 0.5 %    NRBC% 0.0 <=1 %   Basic metabolic panel    Collection Time: 01/25/24  3:54 AM   Result Value Ref Range    Sodium Level 135 135 - 145 mmol/L    Potassium Level 3.6 3.5 - 5.1 mmol/L    Chloride 100 98 - 110 mmol/L    Carbon Dioxide 30 21 - 32 mmol/L    Glucose Level 92 70 - 115 mg/dL    Blood Urea Nitrogen 42.0 (H) 7.0 - 20.0 mg/dL    Creatinine 3.52 (H) 0.66 - 1.25 mg/dL    BUN/Creatinine Ratio 12 12 - 20    Calcium Level Total 8.5 8.4 - 10.2 mg/dL    Anion Gap 5.0 2.0 - 13.0 mEq/L    eGFR 14 mls/min/1.73/m2   POCT glucose    Collection Time: 01/25/24 11:53 AM   Result Value Ref Range    POCT Glucose 144 (H) 70 - 110 mg/dL   Urinalysis, Reflex to Urine Culture    Collection Time: 01/25/24  1:14 PM    Specimen: Urine, Clean Catch   Result Value Ref Range    Color, UA Yellow Yellow, Light-Yellow, Dark Yellow, Brenda, Straw    Appearance,  UA Clear Clear    Specific Gravity, UA 1.010 1.005 - 1.030    pH, UA 7.5 5.0 - 8.5    Protein, UA 30 (A) Negative    Glucose, UA Negative Negative, Normal    Ketones, UA Negative Negative    Blood, UA Moderate (A) Negative    Bilirubin, UA Negative Negative    Urobilinogen, UA 0.2 0.2, 1.0, Normal    Nitrites, UA Negative Negative    Leukocyte Esterase, UA Large (A) Negative   Urinalysis, Microscopic    Collection Time: 01/25/24  1:14 PM   Result Value Ref Range    Bacteria, UA Many (A) None Seen, Rare, Occasional /HPF    RBC, UA 0-5 None Seen, 0-2, 3-5, 0-5 /HPF    WBC, UA 50-99 (A) None Seen, 0-2, 3-5, 0-5 /HPF    Squamous Epithelial Cells, UA Occasional None Seen, Rare, Occasional, Occ /HPF   POCT glucose    Collection Time: 01/25/24  4:03 PM   Result Value Ref Range    POCT Glucose 250 (H) 70 - 110 mg/dL   Magnesium    Collection Time: 01/25/24  8:19 PM   Result Value Ref Range    Magnesium Level 1.90 1.80 - 2.40 mg/dL   Troponin I    Collection Time: 01/25/24  8:19 PM   Result Value Ref Range    Troponin-I 5.400 (HH) 0.000 - 0.034 ng/mL   CK    Collection Time: 01/25/24  8:19 PM   Result Value Ref Range    Creatine Kinase 82 30 - 135 U/L   POCT glucose    Collection Time: 01/25/24  8:55 PM   Result Value Ref Range    POCT Glucose 221 (H) 70 - 110 mg/dL   APTT    Collection Time: 01/25/24  9:11 PM   Result Value Ref Range    PTT 27.9 23.0 - 29.4 seconds   Protime-INR    Collection Time: 01/25/24  9:11 PM   Result Value Ref Range    PT 11.0 9.3 - 11.9 seconds    INR 1.1 <5.0   CBC with Differential    Collection Time: 01/25/24  9:11 PM   Result Value Ref Range    WBC 8.43 4.00 - 11.50 x10(3)/mcL    RBC 3.33 (L) 4.00 - 5.10 x10(6)/mcL    Hgb 10.4 (L) 11.8 - 16.0 g/dL    Hct 31.1 (L) 36.0 - 48.0 %    MCV 93.4 79.0 - 99.0 fL    MCH 31.2 27.0 - 34.0 pg    MCHC 33.4 31.0 - 37.0 g/dL    RDW 16.1 (H) 11.0 - 14.5 %    Platelet 206 140 - 371 x10(3)/mcL    MPV 8.3 (L) 9.4 - 12.4 fL    Neut % 68.1 37 - 73 %    Lymph % 15.7  (L) 20 - 55 %    Mono % 10.2 4.7 - 12.5 %    Eos % 4.9 0.7 - 7 %    Basophil % 0.7 0.1 - 1.2 %    Lymph # 1.32 1.16 - 3.74 x10(3)/mcL    Neut # 5.75 1.56 - 6.13 x10(3)/mcL    Mono # 0.86 (H) 0.24 - 0.36 x10(3)/mcL    Eos # 0.41 (H) 0.04 - 0.36 x10(3)/mcL    Baso # 0.06 0.01 - 0.08 x10(3)/mcL    IG# 0.03 0.0001 - 0.031 x10(3)/mcL    IG% 0.4 0 - 0.5 %    NRBC% 0.0 <=1 %       Significant Imaging:  Imaging Results    None         EKG:    Results for orders placed or performed during the hospital encounter of 01/23/24   EKG 12-lead    Narrative    Test Reason : R00.1,    Vent. Rate : 060 BPM     Atrial Rate : 050 BPM     P-R Int : 000 ms          QRS Dur : 094 ms      QT Int : 466 ms       P-R-T Axes : 000 000 235 degrees     QTc Int : 466 ms    Sinus bradycardia  ST and T wave abnormality, consider inferior ischemia  ST and T wave abnormality, consider anterolateral ischemia  Abnormal ECG  When compared with ECG of 25-JAN-2024 16:34,  Vent. rate has decreased BY  49 BPM  T wave inversion now evident in Inferior leads  T wave inversion more evident in Lateral leads  QT has shortened  Confirmed by Yash MUELLER, Roman ([id number]) on 1/25/2024 7:19:12 PM    Referred By: [email or web address]   SELF           Confirmed By:Roman Berrios MD       Telemetry:  Third-degree heart block    Physical Exam  Constitutional:       Appearance: She is ill-appearing.   Cardiovascular:      Rate and Rhythm: Bradycardia present. Rhythm irregular.      Heart sounds: Normal heart sounds.   Pulmonary:      Effort: Pulmonary effort is normal.      Breath sounds: Normal breath sounds.   Abdominal:      General: Abdomen is flat.   Neurological:      General: No focal deficit present.      Mental Status: She is alert.   Psychiatric:         Mood and Affect: Mood normal.         Behavior: Behavior normal.         Judgment: Judgment normal.         Home Medications:   No current facility-administered medications on file prior to encounter.     Current Outpatient  Medications on File Prior to Encounter   Medication Sig Dispense Refill    arginine-vitamin C-vitamin E 4.5 gram-156 mg/9.2 gram PwPk Take 1 packet by mouth 2 (two) times a day.      ascorbic acid, vitamin C, (VITAMIN C) 500 MG tablet Take 500 mg by mouth 2 (two) times daily.      atorvastatin (LIPITOR) 80 MG tablet Take 80 mg by mouth once daily.      CENTRUM SILVER WOMEN 8 mg iron-400 mcg-300 mcg Tab Take 1 tablet by mouth once daily.      diltiaZEM (CARDIZEM) 120 MG tablet Take 120 mg by mouth Daily.      famotidine (PEPCID) 20 MG tablet Take 20 mg by mouth 2 (two) times daily.      ferrous sulfate 325 (65 FE) MG EC tablet Take 325 mg by mouth 2 (two) times daily.      FLUoxetine 10 MG Tab Take 10 mg by mouth once daily.      hydrALAZINE (APRESOLINE) 25 MG tablet Take 25 mg by mouth 3 (three) times daily.      hydrOXYzine HCL (ATARAX) 25 MG tablet Take 25 mg by mouth 4 (four) times daily.      insulin detemir U-100 (LEVEMIR) 100 unit/mL injection Inject 35 Units into the skin 2 (two) times a day.      insulin regular 100 unit/mL Inj injection Inject into the skin 4 (four) times daily. Per sliding scale      loratadine (CLARITIN) 10 mg tablet Take 10 mg by mouth once daily.      metoprolol tartrate (LOPRESSOR) 50 MG tablet Take 50 mg by mouth 2 (two) times daily.      phenazopyridine (PYRIDIUM) 200 MG tablet Take 200 mg by mouth 3 (three) times daily with meals.      polyethylene glycol (GLYCOLAX) 17 gram PwPk Take 17 g by mouth once daily.      traZODone (DESYREL) 50 MG tablet Take 50 mg by mouth every evening.         Current Inpatient Medications:    Current Facility-Administered Medications:     0.9%  NaCl infusion (for blood administration), , Intravenous, Q24H PRN, Tony Larose MD    acetaminophen tablet 650 mg, 650 mg, Oral, Q4H PRN, Melly Ruiz MD, 650 mg at 01/24/24 1934    [START ON 1/26/2024] aspirin EC tablet 325 mg, 325 mg, Oral, Daily, Micah Cruz MD    [START ON 1/26/2024]  atorvastatin tablet 40 mg, 40 mg, Oral, Daily, Micah Cruz MD    cefTRIAXone (Rocephin) 1 g in dextrose 5 % in water (D5W) 100 mL IVPB (MB+), 1 g, Intravenous, Q24H, Cruz Jacobs MD, Stopped at 01/25/24 1603    dextrose 10% bolus 125 mL 125 mL, 12.5 g, Intravenous, PRN, Latrell Beltran,     dextrose 10% bolus 250 mL 250 mL, 25 g, Intravenous, PRN, Latrell Beltran DO    famotidine (PF) injection 20 mg, 20 mg, Intravenous, Daily, Latrell Beltran DO, 20 mg at 01/25/24 1018    FLUoxetine capsule 10 mg, 10 mg, Oral, Daily, Latrell Beltran DO, 10 mg at 01/25/24 1020    glucagon (human recombinant) injection 1 mg, 1 mg, Intramuscular, PRN, Latrell Beltran,     glucose chewable tablet 16 g, 16 g, Oral, PRN, Latrell Beltran,     glucose chewable tablet 24 g, 24 g, Oral, PRN, Latrell Beltran,     heparin 25,000 units in dextrose 5% (100 units/ml) IV bolus from bag - ADDITIONAL PRN BOLUS - 30 units/kg (max bolus 4000 units), 30 Units/kg (Adjusted), Intravenous, PRN, Micah Cruz MD    heparin 25,000 units in dextrose 5% (100 units/ml) IV bolus from bag - ADDITIONAL PRN BOLUS - 60 units/kg (max bolus 4000 units), 58.4 Units/kg (Adjusted), Intravenous, PRN, Micah Cruz MD    heparin 25,000 units in dextrose 5% (100 units/ml) IV bolus from bag INITIAL BOLUS (max bolus 4000 units), 58.4 Units/kg (Adjusted), Intravenous, Once, Micah Cruz MD    heparin 25,000 units in dextrose 5% 250 mL (100 units/mL) infusion LOW INTENSITY nomogram - [facility], 0-40 Units/kg/hr (Adjusted), Intravenous, Continuous, Micah Cruz MD    insulin detemir U-100 (Levemir) pen 20 Units, 20 Units, Subcutaneous, QHS, Latrell Beltran DO, 20 Units at 01/25/24 2057    magnesium oxide tablet 800 mg, 800 mg, Oral, PRN, Latrell Beltran DO    magnesium oxide tablet 800 mg, 800 mg, Oral, PRN, Latrell Beltran DO    metoprolol injection 2.5 mg, 2.5 mg, Intravenous, Q6H PRN, Cruz Jacobs, MD     miconazole NITRATE 2 % top powder, , Topical (Top), BID, Cruz Jacobs MD, Given at 01/25/24 2100    mupirocin 2 % ointment, , Nasal, BID, Melly Ruiz MD, Given at 01/25/24 2059    ondansetron injection 4 mg, 4 mg, Intravenous, Q8H PRN, Latrell Beltran,     potassium bicarbonate disintegrating tablet 35 mEq, 35 mEq, Oral, PRN, Latrell Beltran,     potassium bicarbonate disintegrating tablet 50 mEq, 50 mEq, Oral, PRN, Latrell Beltran,     potassium bicarbonate disintegrating tablet 60 mEq, 60 mEq, Oral, PRN, Latrell Beltran,     potassium chloride SA CR tablet 20 mEq, 20 mEq, Oral, Daily, Cruz Jacobs MD, 20 mEq at 01/25/24 1533    potassium, sodium phosphates 280-160-250 mg packet 2 packet, 2 packet, Oral, PRN, Latrell Beltran,     potassium, sodium phosphates 280-160-250 mg packet 2 packet, 2 packet, Oral, PRN, Latrell Beltran,     potassium, sodium phosphates 280-160-250 mg packet 2 packet, 2 packet, Oral, PRN, Latrell Beltran,     prochlorperazine injection Soln 5 mg, 5 mg, Intravenous, Q6H PRN, Latrell Beltran DO    Questran and Aquaphor Topical Compound, , Topical (Top), BID, Cruz Jacobs MD, Given at 01/25/24 2100    sodium chloride 0.9% flush 10 mL, 10 mL, Intravenous, PRN, Latrell Beltran,     traZODone tablet 50 mg, 50 mg, Oral, QHS, Latrell Beltran, , 50 mg at 01/25/24 2110         VTE Risk Mitigation (From admission, onward)           Ordered     heparin 25,000 units in dextrose 5% (100 units/ml) IV bolus from bag - ADDITIONAL PRN BOLUS - 60 units/kg (max bolus 4000 units)  As needed (PRN)        Question:  Heparin Infusion Adjustment (DO NOT MODIFY ANSWER)  Answer:  [email or web address]\Images\Pharmacy\HeparinInfusions\heparin LOW INTENSITY nomogram for [facility] [id number].pdf    01/25/24 2053     heparin 25,000 units in dextrose 5% (100 units/ml) IV bolus from bag - ADDITIONAL PRN BOLUS - 30 units/kg (max bolus 4000 units)  As needed (PRN)        Question:   Heparin Infusion Adjustment (DO NOT MODIFY ANSWER)  Answer:  [email or web address]\epic\Images\Pharmacy\HeparinInfusions\heparin LOW INTENSITY nomogram for [facility] [id number].pdf    01/25/24 2053     heparin 25,000 units in dextrose 5% (100 units/ml) IV bolus from bag INITIAL BOLUS (max bolus 4000 units)  Once        Question:  Heparin Infusion Adjustment (DO NOT MODIFY ANSWER)  Answer:  [email or web address]\epic\Images\Pharmacy\HeparinInfusions\heparin LOW INTENSITY nomogram for [facility] [id number].pdf    01/25/24 2053     heparin 25,000 units in dextrose 5% 250 mL (100 units/mL) infusion LOW INTENSITY nomogram - [facility]  Continuous        Question:  Begin at (units/kg/hr)  Answer:  12    01/25/24 2053     IP VTE LOW RISK PATIENT  Once         01/23/24 2136     Place sequential compression device  Until discontinued         01/23/24 2136                    Assessment:     Patient is a 68-year-old female end-stage renal disease on hemodialysis with atrial fibrillation rapid ventricular response and converted to normal sinus rhythm on beta-blockers and diltiazem.  The patient has developed third-degree heart block and an elevated troponin of 5    Plan:     Patient has been started on a heparin drip is on aspirin.  She is hemodynamically stable.  Recommend transfer to [facility] for higher level of care.  I believe the patient possibly could need a transvenous pacer if she develops hemodynamic instability.  Also recommend evaluation of left ventricular function with an echocardiogram.  Given the patient's multiple risk factors for coronary artery disease and elevated troponin I believe that she will need to undergo cardiac catheterization.  I discussed this with the patient and she is agreeable for transfer.  The covering hospitalist will be contacted by the nursing staff concerning my recommendations.    Thank you for your consult.     Adonay Carrasquillo MD  Cardiology  [facility]  01/25/2024 10:12 PM

## 2024-01-26 NOTE — NURSING
Notified Dr. Cruz of recommendation of Dr. Tubbs to transfer patient to [facility];new order noted;awaiting bed

## 2024-01-26 NOTE — ASSESSMENT & PLAN NOTE
Patient with Persistent (7 days or more) atrial fibrillation which is controlled currently with Beta Blocker. Patient is currently in sinus rhythm.[id number] Score: 2. HASBLED Score: . Anticoagulation indicated. Anticoagulation done with heprin  .

## 2024-01-26 NOTE — PLAN OF CARE
0845-Lorraine at [facility] transfer center states they are at capacity.    0935-[facility] states they have no available beds at this time.  [facility] states they have no beds at this time.      0945-[facility] transfer center states they are at capacity at this time.    1000-Transfer request called into Klarissa at [facility].  Records faxed as requested.  Olvin at [facility] sent transfer request.    1320-Lorraine states no beds at [facility].  Facesheet faxed to Velma at [facility].    35[facility] [place] [facility]

## 2024-01-26 NOTE — PLAN OF CARE
01/26/24 1256   Discharge Reassessment   Assessment Type Discharge Planning Reassessment   Did the patient's condition or plan change since previous assessment? Yes   Discharge Plan discussed with: Patient   Communicated SKYLA with patient/caregiver Date not available/Unable to determine   Discharge Plan A Hospital Transfer  (Interventional Cardiology)   Discharge Plan B Return to Nursing Home   DME Needed Upon Discharge  none   Transition of Care Barriers None   Why the patient remains in the hospital Requires continued medical care;Acute bed availability   Post-Acute Status   Coverage [employer] [facility]   Hospital Resources/Appts/Education Provided Post-Acute resouces added to AVS   Discharge Delays (!) Post-Acute Set-up

## 2024-01-26 NOTE — PROGRESS NOTES
[facility]/Surg  [facility] Medicine  Progress Note    Patient Name: Elvira Caldwell  MRN: [id number]  Patient Class: IP- Inpatient   Admission Date: 1/23/2024  Length of Stay: 3 days  Attending Physician: Cruz Jacobs MD  Primary Care Provider: Chaparrita, Primary Doctor        Subjective:     Principal Problem:Atrial fibrillation with RVR        HPI:     Pt is a 68 y.o. female with hx of Paroxysmal Afib, ESRD on HD, DMII, Depression sent to ER after HD today with NV and not feeling well during and after HD.  After HD finished she was noted to be Hypotensive and got fluid back.  On arrival to ER was found to be in Afib with RVR and as well as hypotensive and anemic.  No reports of blood loss that she is aware.       Review of patient's allergies indicates:  No Known Allergies      ESRD on HD, HTN, Depression     Overview/Hospital Course:  01/26/2024 patient resting comfortably in bed on telemetry.  Troponin elevated to 5.  Cis has evaluated the patient recommended transfer for heart catheterization.  She had an episode complete heart block last night which has resolved he is currently in a normal sinus rhythm.  Work on transfer now to soon as possible.    Interval History:     Review of Systems   Constitutional:  Positive for appetite change. Negative for activity change, chills, diaphoresis, fatigue and fever.   HENT:  Negative for congestion, ear pain, rhinorrhea and sore throat.    Eyes:  Negative for pain, discharge and redness.   Respiratory:  Positive for chest tightness and shortness of breath. Negative for apnea, cough, choking and wheezing.    Cardiovascular:  Negative for chest pain, palpitations and leg swelling.   Gastrointestinal:  Negative for abdominal distention, abdominal pain, blood in stool, constipation, diarrhea, nausea and vomiting.   Endocrine: Negative for cold intolerance, heat intolerance and polyuria.   Genitourinary:  Negative for difficulty urinating, dysuria, frequency,  hematuria and pelvic pain.   Musculoskeletal:  Negative for arthralgias, back pain, gait problem, joint swelling and myalgias.   Skin:  Negative for color change, pallor, rash and wound.   Neurological:  Negative for seizures, syncope, speech difficulty, numbness and headaches.   Psychiatric/Behavioral:  Negative for agitation, confusion, hallucinations and sleep disturbance. The patient is not nervous/anxious.      Objective:     Vital Signs (Most Recent):  Temp: 97.4 °F (36.3 °C) (01/26/24 0701)  Pulse: 72 (01/26/24 0701)  Resp: 20 (01/26/24 0701)  BP: (!) 125/51 (01/26/24 0701)  SpO2: 98 % (01/26/24 0701) Vital Signs (24h Range):  Temp:  [97.4 °F (36.3 °C)-98.2 °F (36.8 °C)] 97.4 °F (36.3 °C)  Pulse:  [] 72  Resp:  [18-20] 20  SpO2:  [95 %-100 %] 98 %  BP: ()/(51-82) 125/51     Weight: 71.9 kg (158 lb 8.2 oz)  Body mass index is 23.41 kg/m².    Intake/Output Summary (Last 24 hours) at 1/26/2024 1320  Last data filed at 1/26/2024 0633  Gross per 24 hour   Intake 855.55 ml   Output 1550 ml   Net -694.45 ml         Physical Exam  Constitutional:       General: She is not in acute distress.     Appearance: Normal appearance. She is ill-appearing. She is not toxic-appearing or diaphoretic.   HENT:      Head: Normocephalic and atraumatic.      Right Ear: External ear normal.      Left Ear: External ear normal.      Nose: Nose normal. No congestion or rhinorrhea.      Mouth/Throat:      Mouth: Mucous membranes are moist.      Pharynx: Oropharynx is clear.   Eyes:      Extraocular Movements: Extraocular movements intact.      Conjunctiva/sclera: Conjunctivae normal.      Pupils: Pupils are equal, round, and reactive to light.   Neck:      Vascular: No carotid bruit.   Cardiovascular:      Rate and Rhythm: Normal rate and regular rhythm.      Pulses: Normal pulses.      Heart sounds: Normal heart sounds. No murmur heard.  Pulmonary:      Effort: Pulmonary effort is normal. No respiratory distress.      Breath  sounds: Rales present. No wheezing or rhonchi.   Abdominal:      General: Abdomen is flat. Bowel sounds are normal. There is no distension.      Palpations: Abdomen is soft.      Tenderness: There is no abdominal tenderness. There is no guarding.   Musculoskeletal:         General: No swelling or tenderness. Normal range of motion.      Cervical back: Normal range of motion and neck supple. No tenderness.      Right lower leg: No edema.      Left lower leg: No edema.   Lymphadenopathy:      Cervical: No cervical adenopathy.   Skin:     General: Skin is warm and dry.      Coloration: Skin is not jaundiced or pale.   Neurological:      General: No focal deficit present.      Mental Status: She is alert and oriented to person, place, and time. Mental status is at baseline.      Cranial Nerves: No cranial nerve deficit.      Motor: No weakness.      Coordination: Coordination normal.      Gait: Gait normal.   Psychiatric:         Mood and Affect: Mood normal.         Behavior: Behavior normal.         Thought Content: Thought content normal.         Judgment: Judgment normal.             Significant Labs: All pertinent labs within the past 24 hours have been reviewed.    Significant Imaging: I have reviewed all pertinent imaging results/findings within the past 24 hours.    Assessment/Plan:      * Atrial fibrillation with RVR  Patient with Persistent (7 days or more) atrial fibrillation which is controlled currently with Beta Blocker. Patient is currently in sinus rhythm.[id number] Score: 2. HASBLED Score: . Anticoagulation indicated. Anticoagulation done with heprin  .    Chronic kidney disease (CKD), active medical management without dialysis, stage 4 (severe)  Creatine stable for now. BMP reviewed- noted Estimated Creatinine Clearance: 14.4 mL/min (A) (based on SCr of 3.9 mg/dL (H)). according to latest data. Based on current GFR, CKD stage is stage 4 - GFR 15-29.  Monitor UOP and serial BMP and adjust therapy as  needed. Renally dose meds. Avoid nephrotoxic medications and procedures.    HTN (hypertension)  Chronic, controlled. Latest blood pressure and vitals reviewed-     Temp:  [97.4 °F (36.3 °C)-98.2 °F (36.8 °C)]   Pulse:  []   Resp:  [18-20]   BP: ()/(51-82)   SpO2:  [95 %-100 %] .   Home meds for hypertension were reviewed and noted below.   Hypertension Medications               diltiaZEM (CARDIZEM) 120 MG tablet Take 120 mg by mouth Daily.    hydrALAZINE (APRESOLINE) 25 MG tablet Take 25 mg by mouth 3 (three) times daily.    metoprolol tartrate (LOPRESSOR) 50 MG tablet Take 50 mg by mouth 2 (two) times daily.            While in the hospital, will manage blood pressure as follows; Continue home antihypertensive regimen    Will utilize p.r.n. blood pressure medication only if patient's blood pressure greater than 180/110 and she develops symptoms such as worsening chest pain or shortness of breath.    Elevated troponin  Obtain echocardiogram.  Cardiology following.  Transfer for left heart catheterization.      Third degree AV block  As condition was transient we will continue to monitor very closely along with Cardiology.  Work on transfer for higher level of care and she may need a pacemaker.        VTE Risk Mitigation (From admission, onward)           Ordered     heparin 25,000 units in dextrose 5% (100 units/ml) IV bolus from bag - ADDITIONAL PRN BOLUS - 60 units/kg (max bolus 4000 units)  As needed (PRN)        Question:  Heparin Infusion Adjustment (DO NOT MODIFY ANSWER)  Answer:  \\[email or web address]\epic\Images\Pharmacy\HeparinInfusions\heparin LOW INTENSITY nomogram for [facility] [id number].pdf    01/25/24 2053     heparin 25,000 units in dextrose 5% (100 units/ml) IV bolus from bag - ADDITIONAL PRN BOLUS - 30 units/kg (max bolus 4000 units)  As needed (PRN)        Question:  Heparin Infusion Adjustment (DO NOT MODIFY ANSWER)  Answer:  \\[email or web address]\epic\Images\Pharmacy\HeparinInfusions\heparin LOW INTENSITY  nomogram for [facility] [id number].pdf    01/25/24 2053     heparin 25,000 units in dextrose 5% 250 mL (100 units/mL) infusion LOW INTENSITY nomogram - [facility]  Continuous        Question:  Begin at (units/kg/hr)  Answer:  12    01/25/24 2053     IP VTE LOW RISK PATIENT  Once         01/23/24 2136     Place sequential compression device  Until discontinued         01/23/24 2136                    Discharge Planning   SKYLA: 1/27/2024     Code Status: Full Code   Is the patient medically ready for discharge?:     Reason for patient still in hospital (select all that apply): Patient unstable  Discharge Plan A: Hospital Transfer (Interventional Cardiology)   Discharge Delays: (!) Post-Acute Set-up              Yg Kiran MD  Department of Hospital Medicine   [facility]/Surg

## 2024-01-27 ENCOUNTER — OUTSIDE PLACE OF SERVICE (OUTPATIENT)
Dept: GASTROENTEROLOGY | Facility: CLINIC | Age: 69
End: 2024-01-27
Payer: MEDICARE

## 2024-01-27 LAB — BACTERIA UR CULT: ABNORMAL

## 2024-01-27 PROCEDURE — 99223 1ST HOSP IP/OBS HIGH 75: CPT | Mod: ,,, | Performed by: INTERNAL MEDICINE

## 2024-01-27 NOTE — NURSING
[facility] here for transfer;notified Nakia about meds given tonight;bsg,had another bm;ptt & troponin results not back yet

## 2024-01-27 NOTE — NURSING
Received call from [facility] Transfer Center, patient has been accepted to [facility] # [id number] to Dr. Pascual.

## 2024-01-27 NOTE — NURSING
Results of Troponin & PTT back;[facility] just went downstairs with patient;called down to Ed, RN in er to see if [facility] was still there;they were loading patient up;gave them ptt results & told to stop drip until [facility]

## 2024-01-31 ENCOUNTER — TELEPHONE (OUTPATIENT)
Dept: GASTROENTEROLOGY | Facility: CLINIC | Age: 69
End: 2024-01-31
Payer: MEDICARE

## 2024-01-31 NOTE — TELEPHONE ENCOUNTER
----- Message from Kristi Ma MD sent at 1/30/2024  8:54 PM CST -----  Regarding: hospital patient  [facility] to [facility] GI referral to provider accepting new patients with Medicaid.  NBP  ----- Message -----  From: Yoselin Hodge MA  Sent: 1/30/2024   3:27 PM CST  To: Kristi Ma MD    Please advise.   ----- Message -----  From: Nancy Gray  Sent: 1/30/2024  12:44 PM CST  To: Anil LUBIN Staff    Call Type: Staff Message    Who Called: Nakia - Staff of [facility]  Call back number: [phone]  Nature of the call: Schedule Follow appt. For patient  Additional information: n/a

## 2024-02-12 NOTE — DISCHARGE SUMMARY
Hospital Medicine  Discharge Summary    Patient Name: Elvira Caldwell  MRN: [id number]  Admit Date: 1/23/2024  Discharge Date: 1/26/2024   Status: IP- Inpatient   Length of Stay: 3      PHYSICIANS   Admitting Physician: Latrell Beltran DO  Discharging Physician: Cruz Jacobs MD.  Primary Care Physician: Chaparrita, Primary Doctor        DISCHARGE DIAGNOSES   Atrial fibrillation with RVR  Patient with Persistent (7 days or more) atrial fibrillation which is controlled currently with Beta Blocker. Patient is currently in sinus rhythm.[id number] Score: 2. HASBLED Score: . Anticoagulation indicated. Anticoagulation done with heprin  .     Chronic kidney disease (CKD), active medical management without dialysis, stage 4 (severe)  Creatine stable for now. BMP reviewed- noted Estimated Creatinine Clearance: 14.4 mL/min (A) (based on SCr of 3.9 mg/dL (H)). according to latest data. Based on current GFR, CKD stage is stage 4 - GFR 15-29.  Monitor UOP and serial BMP and adjust therapy as needed. Renally dose meds. Avoid nephrotoxic medications and procedures.     HTN (hypertension)  Chronic, controlled. Latest blood pressure and vitals reviewed-      Temp:  [97.4 °F (36.3 °C)-98.2 °F (36.8 °C)]   Pulse:  []   Resp:  [18-20]   BP: ()/(51-82)   SpO2:  [95 %-100 %] .   Home meds for hypertension were reviewed and noted below.   Hypertension Medications                    diltiaZEM (CARDIZEM) 120 MG tablet Take 120 mg by mouth Daily.     hydrALAZINE (APRESOLINE) 25 MG tablet Take 25 mg by mouth 3 (three) times daily.     metoprolol tartrate (LOPRESSOR) 50 MG tablet Take 50 mg by mouth 2 (two) times daily.                While in the hospital, will manage blood pressure as follows; Continue home antihypertensive regimen     Will utilize p.r.n. blood pressure medication only if patient's blood pressure greater than 180/110 and she develops symptoms such as worsening chest pain or shortness of breath.     Elevated  troponin  Obtain echocardiogram.  Cardiology following.  Transfer for left heart catheterization.        Third degree AV block  As condition was transient we will continue to monitor very closely along with Cardiology.  Work on transfer for higher level of care and she may need a pacemaker.      PROCEDURES   * No surgery found *         HOSPITAL COURSE      Principal Problem:Atrial fibrillation with RVR           HPI:     Pt is a 68 y.o. female with hx of Paroxysmal Afib, ESRD on HD, DMII, Depression sent to ER after HD today with NV and not feeling well during and after HD.  After HD finished she was noted to be Hypotensive and got fluid back.  On arrival to ER was found to be in Afib with RVR and as well as hypotensive and anemic.  No reports of blood loss that she is aware.       Review of patient's allergies indicates:  No Known Allergies      ESRD on HD, HTN, Depression      Overview/Hospital Course:  01/26/2024 patient resting comfortably in bed on telemetry.  Troponin elevated to 5.  Cis has evaluated the patient recommended transfer for heart catheterization.  She had an episode complete heart block last night which has resolved he is currently in a normal sinus rhythm.  Work on transfer now to soon as possible.      Accepted [facility] for further cardiac evaluation     STATUS  Stable    DISPOSITION  Discharge to transfer [facility]    DIET      ACTIVITY  As tolerated      FOLLOW-UP      Follow-up Information       [facility] Follow up.    Specialty: Skilled Nursing Facility  Contact information:  [address] [address] [address] [phone]                             DISCHARGE MEDICATION RECONCILIATION        Medication List        ASK your doctor about these medications      arginine-vitamin C-vitamin E 4.5 gram-156 mg/9.2 gram Pwpk     ascorbic acid (vitamin C) 500 MG tablet  Commonly known as: VITAMIN C     atorvastatin 80 MG tablet  Commonly known as:  "LIPITOR  Ask about: Which instructions should I use?     CENTRUM SILVER WOMEN 8 mg iron-400 mcg-50 mcg Tab  Generic drug: multivit-min-iron-FA-vit K-lut     diltiaZEM 120 MG tablet  Commonly known as: CARDIZEM     famotidine 20 MG tablet  Commonly known as: PEPCID     ferrous sulfate 325 (65 FE) MG EC tablet     FLUoxetine 10 MG Tab     hydrALAZINE 25 MG tablet  Commonly known as: APRESOLINE     hydrOXYzine HCL 25 MG tablet  Commonly known as: ATARAX     insulin detemir U-100 100 unit/mL injection  Commonly known as: Levemir     insulin regular 100 unit/mL injection     loratadine 10 mg tablet  Commonly known as: CLARITIN     metoprolol tartrate 50 MG tablet  Commonly known as: LOPRESSOR     phenazopyridine 200 MG tablet  Commonly known as: PYRIDIUM     polyethylene glycol 17 gram Pwpk  Commonly known as: GLYCOLAX     traZODone 50 MG tablet  Commonly known as: DESYREL                PHYSICAL EXAM   VITALS: T 98 °F (36.7 °C)   BP (!) 165/70   P 88   RR 20   O2 100 %    Physical Exam  Vitals reviewed.   HENT:      Head: Normocephalic.   Cardiovascular:      Rate and Rhythm: Normal rate.   Pulmonary:      Effort: Pulmonary effort is normal.   Neurological:      Mental Status: She is alert.              Discharge time: 33 minutes     Cruz Jacobs MD  [facility] Medicine       DIAGNOSITCS   CBC:   No results for input(s): "WBC", "HGB", "HCT", "PLT", "NEUTOPHILPCT", "LYMPH", "MONO", "EOSINOPHIL" in the last 168 hours.    CMP: No results for input(s): "GLU", "CALCIUM", "ALBUMIN", "PROT", "NA", "K", "CO2", "CL", "BUN", "CREATININE", "ALKPHOS", "ALT", "AST", "BILITOT", "MG", "PHOS" in the last 168 hours.  CrCl cannot be calculated (Patient's most recent lab result is older than the maximum 7 days allowed.).    Labs within the past 24 hours have been reviewed.     COAG:  No results for input(s): "APTT", "INR", "PTT" in the last 168 hours.    CARDIAC ENZYMES: No results for input(s): "TROPONINI", "CPK", "CKMB" in the last " "72 hours.     No results for input(s): "AMYLASE", "LIPASE" in the last 168 hours.    No results for input(s): "POCTGLUCOSE" in the last 72 hours.     Microbiology Results (last 7 days)       ** No results found for the last 168 hours. **             No results for input(s): "CHOL", "TRIG", "HDL", "LDL" in the last 72 hours. No results found for: "LABA1C", "HGBA1C"     NM Lung Ventilation Perfusion Imaging    Result Date: 1/26/2024  EXAMINATION: NM LUNG VENTILATION AND PERFUSION IMAGING CLINICAL HISTORY: PE suspected, high pretest prob; TECHNIQUE: Forty mCi of Tc-99m-DTPA were placed in the nebulizer. Following the inhalation Tc-99m-DTPA in aerosol and the subsequent IV administration of 6 mCi of Tc-99m-MAA, multiple images of the thorax were obtained in various projections. COMPARISON: None.  Correlation with same day radiograph FINDINGS: On the perfusion study, there is slight heterogeneous enhancement without evidence for mismatched segmental defects.  The ventilation study reveals normal uptake.  The CXR normal.     This represents a very low probability of pulmonary embolism. Electronically signed by: Servando Willoughby MD Date:    01/26/2024 Time:    18:31    Echo    Result Date: 1/26/2024    Left Ventricle: There is normal systolic function with a visually estimated ejection fraction of 60 - 65%. Grade I diastolic dysfunction.   Right Ventricle: Normal right ventricular cavity size. Systolic function is normal.   Aortic Valve: There is mild aortic valve sclerosis.   Mitral Valve: There is mild bileaflet sclerosis. There is mild mitral annular calcification present.   Tricuspid Valve: There is mild regurgitation.   The estimated pulmonary artery systolic pressure is 16 mmHg.     X-Ray Chest AP Portable    Result Date: 1/26/2024  AP CHEST X-RAY: CPT: 02391 History: Shortness of breath. Comparison: 09/10/2020 and 09/09/2020. Findings: There are chronic changes in the right greater left upper lobes and right greater " left lung bases with pleuroparenchymal scarring.  No new suspicious alveolar or interstitial opacities, lung nodule/mass, effusion, or pneumothorax is seen.  There is a right IJ hemodialysis catheter with catheter tip overlying the SVC/RA junction the cardiac silhouette, central pulmonary vessels and mediastinum are normal in size and are grossly unremarkable.  There are postsurgical changes from prior ORIF of the right proximal humerus.  There is widening of the left acromioclavicular joint that was not present on the 2020 prior exam with question of postsurgical changes in the distal left clavicle tip.     1. No acute chest disease is identified. 2. Postsurgical changes and support structures, as above. 3. Interval widening of the left acromioclavicular joint with questionable left distal acromioplasty. Electronically signed by: Wally Wiggins MD Date:    01/26/2024 Time:    15:10      N/A

## 2024-04-08 ENCOUNTER — TELEPHONE (OUTPATIENT)
Dept: GASTROENTEROLOGY | Facility: CLINIC | Age: 69
End: 2024-04-08
Payer: MEDICARE

## 2024-04-08 NOTE — TELEPHONE ENCOUNTER
Called [facility] and patient was seen in hospital by Dr. Ma. Patient was told she needed a f/u with Dr. Ma but patient has [employer]. Please advise. 4/8/24 LRA       ----- Message from Briana Bridges sent at 4/8/2024 11:09 AM CDT -----  Contact: Nakia with [facility]  Type: Staff Message    Caller: Nakia with [facility] [phone]  Call Back Number: [phone]  Nature of the Call: Pt H&H levels were low in the hospital at [facility] pt had a blood transfusion 1-2 week follow up  Additional Information: na

## 2024-04-08 NOTE — TELEPHONE ENCOUNTER
Dr. Ma does not take medicaid. She will have to establish with another GI.   See 1/31/2024 encounter.  KN

## 2024-04-09 NOTE — TELEPHONE ENCOUNTER
Called Nakia back at the shelter home and let her know we are not taking on any new Medicaid patients. She understood and had no questions. 4/9/24 LRA

## 2024-12-04 ENCOUNTER — TELEPHONE (OUTPATIENT)
Dept: GASTROENTEROLOGY | Facility: CLINIC | Age: 69
End: 2024-12-04
Payer: MEDICARE

## 2024-12-04 NOTE — TELEPHONE ENCOUNTER
----- Message from Fidel Barrow sent at 12/4/2024  8:08 AM CST -----  Contact: alie parra    ----- Message -----  From: Anneliese Benson  Sent: 12/3/2024   9:00 AM CST  To: Anil LUBIN Staff    Type: Staff Message     Who called: alie parra  Call back number: [phone]  Reason for the call: referral  Additional information: n/a

## 2025-08-08 ENCOUNTER — HOSPITAL ENCOUNTER (EMERGENCY)
Facility: HOSPITAL | Age: 70
Discharge: HOME | End: 2025-08-08
Attending: FAMILY MEDICINE
Payer: MEDICARE

## 2025-08-08 VITALS
WEIGHT: 155.81 LBS | SYSTOLIC BLOOD PRESSURE: 137 MMHG | BODY MASS INDEX: 23.08 KG/M2 | DIASTOLIC BLOOD PRESSURE: 79 MMHG | OXYGEN SATURATION: 99 % | HEART RATE: 78 BPM | RESPIRATION RATE: 18 BRPM | HEIGHT: 69 IN | TEMPERATURE: 98 F

## 2025-08-08 DIAGNOSIS — R61 DIAPHORESIS: ICD-10-CM

## 2025-08-08 DIAGNOSIS — E16.2 HYPOGLYCEMIA: Primary | ICD-10-CM

## 2025-08-08 LAB
ALBUMIN SERPL-MCNC: 2.5 G/DL (ref 3.4–4.8)
ALBUMIN/GLOB SERPL: 0.8 RATIO (ref 1.1–2)
ALP SERPL-CCNC: 97 UNIT/L (ref 40–150)
ALT SERPL-CCNC: 5 UNIT/L (ref 0–55)
ANION GAP SERPL CALC-SCNC: 9 MEQ/L
AST SERPL-CCNC: 10 UNIT/L (ref 11–45)
BASOPHILS # BLD AUTO: 0.09 X10(3)/MCL (ref 0.01–0.08)
BASOPHILS NFR BLD AUTO: 1.2 % (ref 0.1–1.2)
BILIRUB SERPL-MCNC: 0.3 MG/DL
BUN SERPL-MCNC: 15 MG/DL (ref 9.8–20.1)
CALCIUM SERPL-MCNC: 7.6 MG/DL (ref 8.4–10.2)
CHLORIDE SERPL-SCNC: 95 MMOL/L (ref 98–107)
CO2 SERPL-SCNC: 31 MMOL/L (ref 23–31)
CREAT SERPL-MCNC: 2.51 MG/DL (ref 0.55–1.02)
CREAT/UREA NIT SERPL: 6
EOSINOPHIL # BLD AUTO: 0.43 X10(3)/MCL (ref 0.04–0.36)
EOSINOPHIL NFR BLD AUTO: 5.9 % (ref 0.7–7)
ERYTHROCYTE [DISTWIDTH] IN BLOOD BY AUTOMATED COUNT: 13.8 % (ref 11–14.5)
GFR SERPLBLD CREATININE-BSD FMLA CKD-EPI: 20 ML/MIN/1.73/M2
GLOBULIN SER-MCNC: 3.1 GM/DL (ref 2.4–3.5)
GLUCOSE SERPL-MCNC: 99 MG/DL (ref 82–115)
HCT VFR BLD AUTO: 32 % (ref 36–48)
HGB BLD-MCNC: 10.6 G/DL (ref 11.8–16)
IMM GRANULOCYTES # BLD AUTO: 0.03 X10(3)/MCL (ref 0–0.03)
IMM GRANULOCYTES NFR BLD AUTO: 0.4 % (ref 0–0.5)
LYMPHOCYTES # BLD AUTO: 1.18 X10(3)/MCL (ref 1.16–3.74)
LYMPHOCYTES NFR BLD AUTO: 16.2 % (ref 20–55)
MAGNESIUM SERPL-MCNC: 1.8 MG/DL (ref 1.6–2.6)
MCH RBC QN AUTO: 30.2 PG (ref 27–34)
MCHC RBC AUTO-ENTMCNC: 33.1 G/DL (ref 31–37)
MCV RBC AUTO: 91.2 FL (ref 79–99)
MONOCYTES # BLD AUTO: 0.72 X10(3)/MCL (ref 0.24–0.36)
MONOCYTES NFR BLD AUTO: 9.9 % (ref 4.7–12.5)
NEUTROPHILS # BLD AUTO: 4.85 X10(3)/MCL (ref 1.56–6.13)
NEUTROPHILS NFR BLD AUTO: 66.4 % (ref 37–73)
NRBC BLD AUTO-RTO: 0 %
PLATELET # BLD AUTO: 144 X10(3)/MCL (ref 140–371)
PMV BLD AUTO: 9.2 FL (ref 9.4–12.4)
POCT GLUCOSE: 109 MG/DL (ref 70–110)
POTASSIUM SERPL-SCNC: 2.7 MMOL/L (ref 3.5–5.1)
PROT SERPL-MCNC: 5.6 GM/DL (ref 5.8–7.6)
RBC # BLD AUTO: 3.51 X10(6)/MCL (ref 4–5.1)
SODIUM SERPL-SCNC: 135 MMOL/L (ref 136–145)
TROPONIN I SERPL HS-MCNC: 28 NG/L
WBC # BLD AUTO: 7.3 X10(3)/MCL (ref 4–11.5)

## 2025-08-08 PROCEDURE — 93010 ELECTROCARDIOGRAM REPORT: CPT | Mod: ,,, | Performed by: INTERNAL MEDICINE

## 2025-08-08 PROCEDURE — 93005 ELECTROCARDIOGRAM TRACING: CPT

## 2025-08-08 PROCEDURE — 80053 COMPREHEN METABOLIC PANEL: CPT | Performed by: FAMILY MEDICINE

## 2025-08-08 PROCEDURE — 83735 ASSAY OF MAGNESIUM: CPT | Performed by: FAMILY MEDICINE

## 2025-08-08 PROCEDURE — 82962 GLUCOSE BLOOD TEST: CPT

## 2025-08-08 PROCEDURE — 85025 COMPLETE CBC W/AUTO DIFF WBC: CPT | Performed by: FAMILY MEDICINE

## 2025-08-08 PROCEDURE — 84484 ASSAY OF TROPONIN QUANT: CPT | Performed by: FAMILY MEDICINE

## 2025-08-08 PROCEDURE — 99285 EMERGENCY DEPT VISIT HI MDM: CPT | Mod: 25

## 2025-08-11 LAB
OHS QRS DURATION: 106 MS
OHS QTC CALCULATION: 513 MS